# Patient Record
Sex: FEMALE | Race: WHITE | NOT HISPANIC OR LATINO | Employment: OTHER | URBAN - METROPOLITAN AREA
[De-identification: names, ages, dates, MRNs, and addresses within clinical notes are randomized per-mention and may not be internally consistent; named-entity substitution may affect disease eponyms.]

---

## 2017-04-04 RX ORDER — BUPROPION HYDROCHLORIDE 150 MG/1
TABLET ORAL
Qty: 90 TABLET | Refills: 0 | Status: SHIPPED | OUTPATIENT
Start: 2017-04-04 | End: 2017-10-06 | Stop reason: SDUPTHER

## 2017-06-12 RX ORDER — SOLIFENACIN SUCCINATE 5 MG/1
TABLET, FILM COATED ORAL
Qty: 90 TABLET | Refills: 0 | Status: SHIPPED | OUTPATIENT
Start: 2017-06-12 | End: 2017-08-07

## 2017-07-12 RX ORDER — LEVOTHYROXINE SODIUM 0.1 MG/1
TABLET ORAL
Qty: 90 TABLET | Refills: 0 | Status: SHIPPED | OUTPATIENT
Start: 2017-07-12 | End: 2017-10-06 | Stop reason: SDUPTHER

## 2017-08-07 ENCOUNTER — OFFICE VISIT (OUTPATIENT)
Dept: FAMILY MEDICINE CLINIC | Facility: CLINIC | Age: 67
End: 2017-08-07

## 2017-08-07 VITALS
DIASTOLIC BLOOD PRESSURE: 70 MMHG | OXYGEN SATURATION: 97 % | HEART RATE: 64 BPM | SYSTOLIC BLOOD PRESSURE: 104 MMHG | TEMPERATURE: 98 F | WEIGHT: 207 LBS | BODY MASS INDEX: 33.27 KG/M2 | HEIGHT: 66 IN

## 2017-08-07 DIAGNOSIS — G89.29 CHRONIC RIGHT-SIDED LOW BACK PAIN WITH RIGHT-SIDED SCIATICA: ICD-10-CM

## 2017-08-07 DIAGNOSIS — E03.9 HYPOTHYROIDISM (ACQUIRED): Primary | ICD-10-CM

## 2017-08-07 DIAGNOSIS — M54.41 CHRONIC RIGHT-SIDED LOW BACK PAIN WITH RIGHT-SIDED SCIATICA: ICD-10-CM

## 2017-08-07 DIAGNOSIS — Z12.31 ENCOUNTER FOR SCREENING MAMMOGRAM FOR HIGH-RISK PATIENT: Primary | ICD-10-CM

## 2017-08-07 DIAGNOSIS — N32.81 OAB (OVERACTIVE BLADDER): ICD-10-CM

## 2017-08-07 PROBLEM — M54.40 CHRONIC RIGHT-SIDED LOW BACK PAIN WITH SCIATICA: Status: ACTIVE | Noted: 2017-08-07

## 2017-08-07 LAB
ALBUMIN SERPL-MCNC: 4.4 G/DL (ref 3.5–5.2)
ALBUMIN/GLOB SERPL: 1.8 G/DL
ALP SERPL-CCNC: 86 U/L (ref 39–117)
ALT SERPL-CCNC: 15 U/L (ref 1–33)
AST SERPL-CCNC: 13 U/L (ref 1–32)
BILIRUB SERPL-MCNC: 0.2 MG/DL (ref 0.1–1.2)
BUN SERPL-MCNC: 13 MG/DL (ref 8–23)
BUN/CREAT SERPL: 14.9 (ref 7–25)
CALCIUM SERPL-MCNC: 9.6 MG/DL (ref 8.6–10.5)
CHLORIDE SERPL-SCNC: 101 MMOL/L (ref 98–107)
CO2 SERPL-SCNC: 28.9 MMOL/L (ref 22–29)
CREAT SERPL-MCNC: 0.87 MG/DL (ref 0.57–1)
GLOBULIN SER CALC-MCNC: 2.5 GM/DL
GLUCOSE SERPL-MCNC: 84 MG/DL (ref 65–99)
POTASSIUM SERPL-SCNC: 4.6 MMOL/L (ref 3.5–5.2)
PROT SERPL-MCNC: 6.9 G/DL (ref 6–8.5)
SODIUM SERPL-SCNC: 144 MMOL/L (ref 136–145)
T4 FREE SERPL-MCNC: 1.5 NG/DL (ref 0.93–1.7)
TSH SERPL DL<=0.005 MIU/L-ACNC: 1.4 MIU/ML (ref 0.27–4.2)

## 2017-08-07 PROCEDURE — 99214 OFFICE O/P EST MOD 30 MIN: CPT | Performed by: FAMILY MEDICINE

## 2017-08-07 PROCEDURE — 72100 X-RAY EXAM L-S SPINE 2/3 VWS: CPT | Performed by: FAMILY MEDICINE

## 2017-08-07 RX ORDER — ALPHA LIPOIC ACID 200 MG
CAPSULE ORAL
COMMUNITY
Start: 2016-01-14

## 2017-08-07 RX ORDER — CHOLECALCIFEROL (VITAMIN D3) 50 MCG
TABLET ORAL
COMMUNITY
Start: 2013-08-12

## 2017-08-07 RX ORDER — ALBUTEROL SULFATE 90 UG/1
AEROSOL, METERED RESPIRATORY (INHALATION)
COMMUNITY
Start: 2013-08-19 | End: 2019-07-15 | Stop reason: SDUPTHER

## 2017-08-07 RX ORDER — BACLOFEN 10 MG/1
10 TABLET ORAL 3 TIMES DAILY PRN
Qty: 20 TABLET | Refills: 2 | Status: SHIPPED | OUTPATIENT
Start: 2017-08-07 | End: 2018-08-20

## 2017-08-07 RX ORDER — OXYBUTYNIN CHLORIDE 5 MG/1
5 TABLET, EXTENDED RELEASE ORAL DAILY
Qty: 30 TABLET | Refills: 5 | Status: SHIPPED | OUTPATIENT
Start: 2017-08-07 | End: 2018-04-15 | Stop reason: SDUPTHER

## 2017-08-07 NOTE — PROGRESS NOTES
Procedure   Procedures   X Ray report:    Further evaluate her complaint of persistent low back pain I have requested a lumbar spine series here today.  There are no old ones to compare this to but this does show significant degenerative disc disease without spondylolisthesis.  Also noted is atherosclerotic changes in the aorta without evidence of aortic aneurysm.

## 2017-08-07 NOTE — PROGRESS NOTES
Subjective   Sharon Gonzalez is a 67 y.o. female presenting with   Chief Complaint   Patient presents with   • Back Pain     low back right side    • Med Refill     wants to change vesicare to oxybutynin per her insurance         HPI Comments: 67-year-old  white female nonsmoker here because she has recurrent low back pain that radiates to the right side.  She says occasionally when she is walking up stairs she feels like she has some weakness in her right leg.  She does not have any numbness in the leg.  She does not have any history of spinal fracture.    She was told by her insurance the Vesicare is very expensive and they suggested she go on Ditropan.  I will make that change.    She has not had a colonoscopy in a long time and does not want to do that but is willing to do a colon stool test.    She has a history of acquired hypothyroidism and is on levothyroxin but has not had a TSH in 2 years so I will order that today as well.    Back Pain          The following portions of the patient's history were reviewed and updated as appropriate: current medications, past family history, past medical history, past social history, past surgical history and problem list.    Review of Systems   Musculoskeletal: Positive for back pain.   All other systems reviewed and are negative.      Objective   Physical Exam   Constitutional: She is oriented to person, place, and time. She appears well-developed and well-nourished. No distress.   HENT:   Head: Normocephalic and atraumatic.   Eyes: EOM are normal. Pupils are equal, round, and reactive to light.   Neck: Normal range of motion. Neck supple.   Cardiovascular: Normal rate and regular rhythm.    Pulmonary/Chest: Effort normal and breath sounds normal.   Abdominal: Soft. Bowel sounds are normal. She exhibits no distension. There is no tenderness.   Musculoskeletal: She exhibits tenderness (he has discomfort to range of motion of her lumbar spine without deformity). She  exhibits no edema or deformity.   Neurological: She is alert and oriented to person, place, and time. No cranial nerve deficit. Coordination normal.   Skin: Skin is warm and dry. She is not diaphoretic.   Psychiatric: She has a normal mood and affect. Her behavior is normal.   Nursing note and vitals reviewed.      Assessment/Plan   Sharon was seen today for back pain and med refill.    Diagnoses and all orders for this visit:    Hypothyroidism (acquired)  -     Comprehensive Metabolic Panel  -     TSH  -     T4, Free    Chronic right-sided low back pain with right-sided sciatica  -     XR Spine Lumbar 2 or 3 View    OAB (overactive bladder)    Other orders  -     oxybutynin XL (DITROPAN-XL) 5 MG 24 hr tablet; Take 1 tablet by mouth Daily.                   I would like him to return for another visit in 6 month(s)

## 2017-08-07 NOTE — PATIENT INSTRUCTIONS
This is a very nice 67-year-old who is here for follow-up for hypothyroidism and overactive bladder but who also has persistent low back pain.  I will prescribe baclofen and request physical therapy.  I would like her to call if this does not help.

## 2017-08-10 ENCOUNTER — HOSPITAL ENCOUNTER (OUTPATIENT)
Dept: PHYSICAL THERAPY | Facility: HOSPITAL | Age: 67
Setting detail: THERAPIES SERIES
Discharge: HOME | End: 2017-08-10

## 2017-08-10 ENCOUNTER — HOSPITAL ENCOUNTER (OUTPATIENT)
Dept: MAMMOGRAPHY | Facility: HOSPITAL | Age: 67
Discharge: HOME | End: 2017-08-10
Admitting: FAMILY MEDICINE

## 2017-08-10 DIAGNOSIS — M54.41 CHRONIC RIGHT-SIDED LOW BACK PAIN WITH RIGHT-SIDED SCIATICA: Primary | ICD-10-CM

## 2017-08-10 DIAGNOSIS — G89.29 CHRONIC RIGHT-SIDED LOW BACK PAIN WITH RIGHT-SIDED SCIATICA: Primary | ICD-10-CM

## 2017-08-10 PROCEDURE — 77063 BREAST TOMOSYNTHESIS BI: CPT

## 2017-08-10 PROCEDURE — G0202 SCR MAMMO BI INCL CAD: HCPCS

## 2017-08-10 PROCEDURE — 97161 PT EVAL LOW COMPLEX 20 MIN: CPT

## 2017-08-10 NOTE — THERAPY EVALUATION
"    Outpatient Physical Therapy Ortho Initial Evaluation  [facility]     Patient Name: Sharon Gonzalez  : 1950  MRN: [id number]  Today's Date: 8/10/2017      Visit Date: 08/10/2017    Patient Active Problem List   Diagnosis   • Hypothyroidism (acquired)   • Skin lesion of face   • Chronic right-sided low back pain with sciatica   • OAB (overactive bladder)        Past Medical History:   Diagnosis Date   • OAB (overactive bladder)         Past Surgical History:   Procedure Laterality Date   • APPENDECTOMY     • BREAST CYST EXCISION Right        Visit Dx:     ICD-10-CM ICD-9-CM   1. Chronic right-sided low back pain with right-sided sciatica M54.41 724.2    G89.29 724.3     338.29             Patient History       08/10/17 0900          History    Chief Complaint Difficulty with daily activities;Joint stiffness;Numbness;Tingling;Pain  -LN      Type of Pain Back pain   right  -LN      Date Current Problem(s) Began --   6 months ago  -LN      Brief Description of Current Complaint Patient reports history of right sided LBP for 6 months without any injury. 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  Patient had x-ray which showed arthritis. Patient reports occasional numbness down lateral right upper leg.   -LN      Onset Date- PT 08/10/2017  -LN      Patient/Caregiver Goals Relieve pain;Improve mobility   \"able to lie down without being uncomfortable.\"   -LN      Occupation/sports/leisure activities newly retired from [employer]; likes to do puzzles, word games, reading. cleaning house  -LN      How has patient tried to help current problem? Alexis   has script for muscle relaxer  -LN      What clinical tests have you had for this problem? X-ray  -LN      Results of Clinical Tests arthritis per patient  -LN      Related/Recent Hospitalizations No  -LN      History of Previous Related Injuries none reported except 1 episode when working of back pain  " "-LN      Pain     Pain Location Back   right  -LN      Pain at Present 3  -LN      Pain at Best 0  -LN      Pain at Worst 6  -LN      Pain Frequency Intermittent  -LN      Pain Description Aching;Sharp   occasionally sharp  -LN      What Performance Factors Make the Current Problem(s) WORSE? sitting a long time >60 minutes; lying down especially on couch; turning over in bed  -LN      What Performance Factors Make the Current Problem(s) BETTER? moving a little/changing positions  -LN      Tolerance Time- Standing good   \"It doesn't really hurt when I stand.\"   -LN      Tolerance Time- Sitting limited at 60 minutes  -LN      Tolerance Time- Walking good except for long distances  -LN      Tolerance Time- Lying limited  -LN      Is your sleep disturbed? Yes  -LN      Is medication used to assist with sleep? No  -LN      What position do you sleep in? Right sidelying;Left sidelying  -LN      Difficulties at work? recently retired  -LN      Difficulties with ADL's? pain with bending over  -LN      Difficulties with recreational activities? pain with house cleaning  -LN      Fall Risk Assessment    Any falls in the past year: No  -LN      Services    Prior Rehab/Home Health Experiences No  -LN      Daily Activities    Primary Language English  -LN      Are you able to read Yes  -LN      Are you able to write Yes  -LN      How does patient learn best? Listening;Demonstration  -LN      Teaching needs identified Home Exercise Program;Management of Condition;Other (comment)   Risks and benefits of treatment explained to patient  -LN      Patient is concerned about/has problems with Climbing Stairs;Difficulty with self care (i.e. bathing, dressing, toileting:;Flexibility;Performing home management (household chores, shopping, care of dependents);Sitting;Walking  -LN      Does patient have problems with the following? None  -LN      Barriers to learning None  -LN      Pt Participated in POC and Goals Yes  -LN      Safety    " "Are you being hurt, hit, or frightened by anyone at home or in your life? No  -LN      Are you being neglected by a caregiver No  -LN        User Key  (r) = Recorded By, (t) = Taken By, (c) = Cosigned By    Initials Name Provider Type    LN Beatris Simental, PT Physical Therapist                PT Ortho       08/10/17 0900    Subjective Comments    Subjective Comments \"It doesn't hurt all the time.\" \"Sometimes I get a catch on the right side and I get a sharp pain, otherwise it aches.\"   -LN    Precautions and Contraindications    Precautions/Limitations no known precautions/limitations  -LN    Subjective Pain    Able to rate subjective pain? yes  -LN    Pre-Treatment Pain Level 3  -LN    Pain Assessment    Pain Score 3  -LN    Pain Location Back  -LN    Pain Orientation Right  -LN    Pain Radiating Towards right hip/groin/lateral thigh  -LN    Pain Frequency Intermittent  -LN    Effect of Pain on Daily Activities Pain with bending over to put on shoes  -LN    Work-Related Injury No  -LN    Posture/Observations    Thoracic Kyphosis Mild;Increased  -LN    Rounded Shoulders Mild  -LN    Lumbar lordosis Mild;Increased  -LN    Iliac crests Left:;Elevated;Standing posture  -LN    Lumbosacral Palpation    SI Left:;Tender  -LN    Lumbosacral Segment Left:;Tender  -LN    Piriformis Left:;Tender  -LN    Erector Spinae (Paraspinals) Left:;Tender  -LN    Lumbar/SI Special Tests    Standing Flexion Test (SI Dysfunction) Right:;Positive  -LN    SLR (Neural Tension) Right:;Positive  -LN    SI Compression Test (SI Dysfunction) Bilateral:;Negative  -LN    SI Distraction Test (SI Dysfunction) Bilateral:;Negative  -LN    AMARA (hip vs. SI Dysfunction) Right:;Positive  -LN    FAIR Test (Piriformis Syndrome) Right:;Positive  -LN    Special Lumbosacral Questions    Do you have pain going up/down stairs? Yes  -LN    Trunk    Flexion AROM Deficit 50%   pain right LB  -LN    Extension AROM Deficit WFL  -LN    Lt Lat Flexion AROM " Deficit 50%   pain  -LN    Right Lateral Flexion AROM Deficit 50%   pain  -LN    Lt Rotation AROM Deficit 75%   pain right LB  -LN    Right Rotation AROM Deficit WFL  -LN    Trunk    Trunk Flexion Gross Movement (4/5) good  -LN    Trunk Extension Gross Movement (4-/5) good minus  -LN    Left Hip    Hip Flexion Gross Movement (5/5) normal  -LN    Right Hip    Hip Flexion Gross Movement (4+/5) good plus   pain right LB area  -LN    Left Knee    Knee Extension Gross Movement (5/5) normal  -LN    Knee Flexion Gross Movement (5/5) normal  -LN    Right Knee    Knee Extension Gross Movement (5/5) normal  -LN    Knee Flexion Gross Movement (5/5) normal  -LN    Left Ankle/Foot    Ankle PF Gross Movement (5/5) normal  -LN    Ankle Dorsiflexion Gross Movement (5/5) normal  -LN    Right Ankle/Foot    Ankle PF Gross Movement (5/5) normal  -LN    Ankle Dorsiflexion Gross Movement (5/5) normal  -LN    Lower Extremity Flexibility    Hamstrings Right:;Moderately limited;Left:;Mildly limited  -LN    Hip Flexors Right:;Mildly limited  -LN    ITB Right:;Moderately limited  -LN    Gait Assessment/Treatment    Gait, Winston Level independent  -LN    Gait, Assistive Device --   none  -LN    Stairs Assessment/Treatment    Stairs, Comment She reports pain with going up and down stairs- has about 12 to enter home  -LN      User Key  (r) = Recorded By, (t) = Taken By, (c) = Cosigned By    Initials Name Provider Type    LN Beatris Simental, PT Physical Therapist                            Therapy Education       08/10/17 1013          Therapy Education    Education Details Patient to use MH PRN at home.   -LN      Given HEP;Symptoms/condition management;Pain management  -LN      Program New   PPT and LTR- to do 2 x day as tolerated.  -LN      How Provided Verbal;Demonstration;Written  -LN      Provided to Patient  -LN      Level of Understanding Teach back education performed;Verbalized;Demonstrated  -LN        User Key  (r) =  "Recorded By, (t) = Taken By, (c) = Cosigned By    Initials Name Provider Type    LN Beatris Simental, PT Physical Therapist                PT OP Goals       08/10/17 0900       PT Short Term Goals    STG Date to Achieve 08/24/17  -LN     STG 1 Patient to verbally report decreased pain in right LB to <5/10 \"at it's worst with ADLs.  -LN     STG 2 Patient to have improved trunk ROM by 25%.   -LN     STG 3 Patient to report decreased disturbed sleep by 50% secondary to less back pain.   -LN     Long Term Goals    LTG Date to Achieve 09/07/17  -LN     LTG 1 Patient to verbally report decreased pain in right LB to <3/10 with ADLs and light housework activities.   -LN     LTG 2 Patient independent with HEP issued by therapist.   -LN     LTG 3 Trunk ROM WFL all planes and pain <3/10.  -LN     LTG 4 Right hip strength improved to 5/5.   -LN     LTG 5 Patient able to go up and down her stairs at home with no c/o increased LBP.   -LN     Time Calculation    PT Goal Re-Cert Due Date 09/07/17  -LN       User Key  (r) = Recorded By, (t) = Taken By, (c) = Cosigned By    Initials Name Provider Type    IGNACIO Simental, PT Physical Therapist                PT Assessment/Plan       08/10/17 1013       PT Assessment    Functional Limitations Impaired gait;Impaired locomotion;Limitation in home management;Performance in self-care ADL;Performance in leisure activities;Limitations in community activities;Limitations in functional capacity and performance  -LN     Impairments Gait;Impaired flexibility;Muscle strength;Sensation;Pain;Posture;Range of motion;Locomotion;Impaired postural alignment  -LN     Assessment Comments Patient presents with 6 month history of right sided LBP with occasional radiating symptoms into right hip/groin/lateral thigh. Patient with decreased trunk ROM, decreased flexibility right leg, decreased strength right hip, disturbed sleep, signs of pelvic obliquity, decreased sitting and walking " "tolerance. Patient with signs of DJD right lumbar with signs of possible right sciatica.  She will benefit from a good core strengthening, stretching and ROM program.   -LN     Please refer to paper survey for additional self-reported information Yes  -LN     Rehab Potential Good  -LN     Patient/caregiver participated in establishment of treatment plan and goals Yes  -LN     Patient would benefit from skilled therapy intervention Yes  -LN     PT Plan    PT Frequency 1x/week;2x/week  -LN     Predicted Duration of Therapy Intervention (days/wks) 4 weeks  -LN     Planned CPT's? PT EVAL LOW COMPLEXITY: 60359;PT THER PROC EA 15 MIN: 75419;PT HOT OR COLD PACK TREAT MCARE;PT MANUAL THERAPY EA 15 MIN: 03048;PT ELECTRICAL STIM UNATTEND: ;PT ULTRASOUND EA 15 MIN: 27420  -LN     Physical Therapy Interventions (Optional Details) modalities;manual therapy techniques;lumbar stabilization;home exercise program;patient/family education;postural re-education;ROM (Range of Motion);strengthening;stretching;taping  -LN     PT Plan Comments Progress with exercises as tolerated. Modalities PRN; Kinesiotaping PRN.   -LN       User Key  (r) = Recorded By, (t) = Taken By, (c) = Cosigned By    Initials Name Provider Type    LN Beatris Simental, PT Physical Therapist                  Exercises       08/10/17 0900          Subjective Comments    Subjective Comments \"It doesn't hurt all the time.\" \"Sometimes I get a catch on the right side and I get a sharp pain, otherwise it aches.\"   -LN      Subjective Pain    Able to rate subjective pain? yes  -LN      Pre-Treatment Pain Level 3  -LN      Exercise 1    Exercise Name 1 PPT  -LN      Cueing 1 Verbal;Tactile  -LN      Reps 1 5  -LN      Time (Seconds) 1 5  -LN      Exercise 2    Exercise Name 2 LTR  -LN      Cueing 2 Verbal;Tactile  -LN      Reps 2 5  -LN      Time (Seconds) 2 5  -LN        User Key  (r) = Recorded By, (t) = Taken By, (c) = Cosigned By    Initials Name Provider " Type    LN Beatris Simental, PT Physical Therapist           Manual Rx (last 36 hours)      Manual Treatments       08/10/17 0900          Manual Rx 1    Manual Rx 1 Location pelvis  -LN      Manual Rx 1 Type MET- shotgun/right anterior innominate  -LN      Manual Rx 1 Duration Good pelvic alignment and leg lengths = after MET.   -LN        User Key  (r) = Recorded By, (t) = Taken By, (c) = Cosigned By    Initials Name Provider Type    IGNACIO Simental, PT Physical Therapist                            Outcome Measures       08/10/17 0900          Other Outcome Measure Tool Used    Other Outcome Measure Tool Comments Back index- score of 30  -LN      Functional Assessment    Outcome Measure Options Other Outcome Measure  -LN        User Key  (r) = Recorded By, (t) = Taken By, (c) = Cosigned By    Initials Name Provider Type    IGNACIO Simental, PT Physical Therapist            Time Calculation:   Start Time: 0900  Stop Time: 0945  Time Calculation (min): 45 min     Therapy Charges for Today     Code Description Service Date Service Provider Modifiers Qty    23092988295 HC PT EVAL LOW COMPLEXITY 3 8/10/2017 Beatris Simental, PT GP 1          PT G-Codes  Outcome Measure Options: Other Outcome Measure         Beatris Simental, PT  8/10/2017

## 2017-08-15 ENCOUNTER — HOSPITAL ENCOUNTER (OUTPATIENT)
Dept: PHYSICAL THERAPY | Facility: HOSPITAL | Age: 67
Setting detail: THERAPIES SERIES
Discharge: HOME | End: 2017-08-15

## 2017-08-15 DIAGNOSIS — G89.29 CHRONIC RIGHT-SIDED LOW BACK PAIN WITH RIGHT-SIDED SCIATICA: Primary | ICD-10-CM

## 2017-08-15 DIAGNOSIS — M54.41 CHRONIC RIGHT-SIDED LOW BACK PAIN WITH RIGHT-SIDED SCIATICA: Primary | ICD-10-CM

## 2017-08-15 PROCEDURE — G0283 ELEC STIM OTHER THAN WOUND: HCPCS

## 2017-08-15 PROCEDURE — 97110 THERAPEUTIC EXERCISES: CPT

## 2017-08-15 NOTE — THERAPY TREATMENT NOTE
"    Outpatient Physical Therapy Ortho Treatment Note  [facility]     Patient Name: Sharon Gonzalez  : 1950  MRN: [id number]  Today's Date: 8/15/2017      Visit Date: 08/15/2017    Visit Dx:    ICD-10-CM ICD-9-CM   1. Chronic right-sided low back pain with right-sided sciatica M54.41 724.2    G89.29 724.3     338.29       Patient Active Problem List   Diagnosis   • Hypothyroidism (acquired)   • Skin lesion of face   • Chronic right-sided low back pain with sciatica   • OAB (overactive bladder)        Past Medical History:   Diagnosis Date   • OAB (overactive bladder)         Past Surgical History:   Procedure Laterality Date   • APPENDECTOMY     • BREAST CYST EXCISION Right                              PT Assessment/Plan       08/15/17 0900       PT Assessment    Assessment Comments Patient continues with persistent pain primarily in right LS area; she did report decreased pain after treatment today. She is tolerating exercises fairly well with only minimal discomfort reported.   -LN     PT Plan    PT Frequency 1x/week;2x/week  -LN     PT Plan Comments Continue per P.O.C. Progress with exercises as tolerated; modalities PRN.   -LN       User Key  (r) = Recorded By, (t) = Taken By, (c) = Cosigned By    Initials Name Provider Type    LN Beatris Simental, PT Physical Therapist                Modalities       08/15/17 0900          Subjective Comments    Subjective Comments \"I'm stiff this am.\"   -LN      Subjective Pain    Able to rate subjective pain? yes  -LN      Pre-Treatment Pain Level 2  -LN      Post-Treatment Pain Level 1  -LN      Subjective Pain Comment \"Stiff and sore\"; Patient reported feeling better after treatment.\"   -LN      Moist Heat     Applied Yes  -LN      Location LB with IFC with patient supine in hooklying  -LN      Rx Minutes 15 mins  -LN       S/P Rx Yes  -LN      ELECTRICAL STIMULATION    Attended/Unattended Unattended  -LN      Stimulation Type IFC   with   -LN      " "Location/Electrode Placement/Other Bilateral LS area; R>L  -LN      Rx Minutes 15 mins  -LN        User Key  (r) = Recorded By, (t) = Taken By, (c) = Cosigned By    Initials Name Provider Type    IGNACIO Simental, PT Physical Therapist                Exercises       08/15/17 0900          Subjective Comments    Subjective Comments \"I'm stiff this am.\"   -LN      Subjective Pain    Able to rate subjective pain? yes  -LN      Pre-Treatment Pain Level 2  -LN      Post-Treatment Pain Level 1  -LN      Subjective Pain Comment \"Stiff and sore\"; Patient reported feeling better after treatment.\"   -LN      Exercise 1    Exercise Name 1 PPT  -LN      Cueing 1 Verbal;Tactile  -LN      Reps 1 10  -LN      Time (Seconds) 1 5  -LN      Exercise 2    Exercise Name 2 LTR  -LN      Cueing 2 Verbal;Tactile  -LN      Reps 2 5  -LN      Time (Seconds) 2 5  -LN      Exercise 3    Exercise Name 3  PPT with ball squeeze  -LN      Reps 3 Other   6  -LN      Time (Seconds) 3 5  -LN      Exercise 4    Exercise Name 4 PPT with hooklying hip abduction vs. TB  -LN      Reps 4 10  -LN      Time (Seconds) 4 5  -LN      Additional Comments blue TB  -LN        User Key  (r) = Recorded By, (t) = Taken By, (c) = Cosigned By    Initials Name Provider Type    IGNACIO Simental, PT Physical Therapist                        Manual Rx (last 36 hours)      Manual Treatments       08/15/17 0900          Manual Rx 1    Manual Rx 1 Location pelvis  -LN      Manual Rx 1 Type MET- shotgun/right anterior innominate  -LN      Manual Rx 1 Duration Good pelvic alignment and leg lengths = after MET.   -LN        User Key  (r) = Recorded By, (t) = Taken By, (c) = Cosigned By    Initials Name Provider Type    IGNACIO Simental, PT Physical Therapist                    Therapy Education       08/15/17 1047          Therapy Education    Education Details Issued blue TB for home. Encouraged patient to only do exercises in a painfree range. She " is also to try ball squeeze execise in sitting if it is too uncomfortable in supine.   -LN      Given HEP;Symptoms/condition management;Pain management  -LN      Program New   added PPT with ball squeeze and PPT with hooklying hip abduction vs. TB.   -LN      How Provided Verbal;Demonstration;Written  -LN      Provided to Patient  -LN      Level of Understanding Teach back education performed;Verbalized;Demonstrated  -LN        User Key  (r) = Recorded By, (t) = Taken By, (c) = Cosigned By    Initials Name Provider Type    LN Beatris Simental, PT Physical Therapist                Time Calculation:   Start Time: 0900  Stop Time: 0945  Time Calculation (min): 45 min    Therapy Charges for Today     Code Description Service Date Service Provider Modifiers Qty    25279375370 HC PT ELECTRICAL STIM UNATTENDED 8/15/2017 Beatris Simental, PT  1    [id number]  PT THER PROC EA 15 MIN 8/15/2017 Beatris Simental, PT GP 1                    Beatris Simental, PT  8/15/2017

## 2017-08-22 ENCOUNTER — APPOINTMENT (OUTPATIENT)
Dept: PHYSICAL THERAPY | Facility: HOSPITAL | Age: 67
End: 2017-08-22

## 2017-08-24 ENCOUNTER — HOSPITAL ENCOUNTER (OUTPATIENT)
Dept: PHYSICAL THERAPY | Facility: HOSPITAL | Age: 67
Setting detail: THERAPIES SERIES
Discharge: HOME | End: 2017-08-24

## 2017-08-24 DIAGNOSIS — M54.41 CHRONIC RIGHT-SIDED LOW BACK PAIN WITH RIGHT-SIDED SCIATICA: Primary | ICD-10-CM

## 2017-08-24 DIAGNOSIS — G89.29 CHRONIC RIGHT-SIDED LOW BACK PAIN WITH RIGHT-SIDED SCIATICA: Primary | ICD-10-CM

## 2017-08-24 PROCEDURE — G0283 ELEC STIM OTHER THAN WOUND: HCPCS

## 2017-08-24 PROCEDURE — 97110 THERAPEUTIC EXERCISES: CPT

## 2017-08-24 NOTE — THERAPY TREATMENT NOTE
"    Outpatient Physical Therapy Ortho Treatment Note  [facility]     Patient Name: Sharon Gonzalez  : 1950  MRN: [id number]  Today's Date: 2017      Visit Date: 2017    Visit Dx:    ICD-10-CM ICD-9-CM   1. Chronic right-sided low back pain with right-sided sciatica M54.41 724.2    G89.29 724.3     338.29       Patient Active Problem List   Diagnosis   • Hypothyroidism (acquired)   • Skin lesion of face   • Chronic right-sided low back pain with sciatica   • OAB (overactive bladder)        Past Medical History:   Diagnosis Date   • OAB (overactive bladder)         Past Surgical History:   Procedure Laterality Date   • APPENDECTOMY     • BREAST CYST EXCISION Right              PT Ortho       17 1500    Subjective Pain    Able to rate subjective pain? yes  -LN    Subjective Pain Comment \"It's more a discomfort.\"   -LN      User Key  (r) = Recorded By, (t) = Taken By, (c) = Cosigned By    Initials Name Provider Type    IGNACIO Simental, PT Physical Therapist                            PT Assessment/Plan       17 1500       PT Assessment    Assessment Comments Patient with overall decreased LBP, but discomfort persists R>L LB. She is tolerating exercises fairly well.  Overall decreased guarding of mobility noted with getting on and off treatment table.   -LN     PT Plan    PT Frequency 1x/week;2x/week  -LN     PT Plan Comments Continue per P.O.C.   -LN       User Key  (r) = Recorded By, (t) = Taken By, (c) = Cosigned By    Initials Name Provider Type    IGNACIO Simental, PT Physical Therapist                Modalities       17 1500          Moist Heat    MH Applied Yes  -LN      Location LB with IFC with patient supine in hooklying  -LN      Rx Minutes 15 mins  -LN       S/P Rx Yes  -LN      ELECTRICAL STIMULATION    Attended/Unattended Unattended  -LN      Stimulation Type IFC   with   -LN      Location/Electrode Placement/Other Bilateral LS area; R>L  -LN      " "Rx Minutes 15 mins  -LN        User Key  (r) = Recorded By, (t) = Taken By, (c) = Cosigned By    Initials Name Provider Type    IGNACIO Simental, PT Physical Therapist                Exercises       08/24/17 1500          Subjective Comments    Subjective Comments \"My back has been feeling better.\"  \"It's not really pain; it's more a discomfort or soreness.\" \"I found my TENS unit and I've been using it.\"   -LN      Subjective Pain    Able to rate subjective pain? yes  -LN      Subjective Pain Comment \"It's more a discomfort.\"   -LN      Exercise 1    Exercise Name 1 PPT  -LN      Cueing 1 Verbal;Tactile  -LN      Reps 1 10  -LN      Time (Seconds) 1 5  -LN      Exercise 2    Exercise Name 2 LTR  -LN      Cueing 2 Verbal;Tactile  -LN      Reps 2 5  -LN      Time (Seconds) 2 5  -LN      Exercise 3    Exercise Name 3  PPT with ball squeeze  -LN      Reps 3 10  -LN      Time (Seconds) 3 5  -LN      Exercise 4    Exercise Name 4 PPT with hooklying hip abduction vs. TB  -LN      Reps 4 10  -LN      Time (Seconds) 4 5  -LN      Additional Comments blue TB  -LN      Exercise 5    Exercise Name 5 supine hamstring stretch with sheet  -LN      Reps 5 5  -LN      Time (Seconds) 5 10  -LN        User Key  (r) = Recorded By, (t) = Taken By, (c) = Cosigned By    Initials Name Provider Type    IGNACIO Simental, PT Physical Therapist                        Manual Rx (last 36 hours)      Manual Treatments       08/24/17 1500          Manual Rx 1    Manual Rx 1 Location pelvis  -LN      Manual Rx 1 Type MET- shotgun/right anterior innominate  -LN      Manual Rx 1 Duration Good pelvic alignment and leg lengths = after MET.   -LN        User Key  (r) = Recorded By, (t) = Taken By, (c) = Cosigned By    Initials Name Provider Type    IGNACIO Simental, PT Physical Therapist                    Therapy Education       08/24/17 1078          Therapy Education    Education Details Patient to use MH and her home TENS " unit PRN for pain relief.  -LN      Given HEP;Symptoms/condition management;Pain management  -LN      Program New   added supine hamstring stretch with sheet  -LN      How Provided Verbal;Demonstration;Written  -LN      Provided to Patient  -LN      Level of Understanding Teach back education performed;Verbalized;Demonstrated  -LN        User Key  (r) = Recorded By, (t) = Taken By, (c) = Cosigned By    Initials Name Provider Type    LN Beatris Simental, PT Physical Therapist                Time Calculation:   Start Time: 1512  Stop Time: 1600  Time Calculation (min): 48 min    Therapy Charges for Today     Code Description Service Date Service Provider Modifiers Qty    57598775449 HC PT ELECTRICAL STIM UNATTENDED 8/24/2017 Beatris Simental, PT  1    51527375977 HC PT THER PROC EA 15 MIN 8/24/2017 Beatris Simental, PT GP 1                    Beatris Simental, PT  8/24/2017

## 2017-08-31 ENCOUNTER — HOSPITAL ENCOUNTER (OUTPATIENT)
Dept: PHYSICAL THERAPY | Facility: HOSPITAL | Age: 67
Setting detail: THERAPIES SERIES
Discharge: HOME | End: 2017-08-31

## 2017-08-31 DIAGNOSIS — G89.29 CHRONIC RIGHT-SIDED LOW BACK PAIN WITH RIGHT-SIDED SCIATICA: Primary | ICD-10-CM

## 2017-08-31 DIAGNOSIS — M54.41 CHRONIC RIGHT-SIDED LOW BACK PAIN WITH RIGHT-SIDED SCIATICA: Primary | ICD-10-CM

## 2017-08-31 PROCEDURE — G0283 ELEC STIM OTHER THAN WOUND: HCPCS

## 2017-08-31 PROCEDURE — 97110 THERAPEUTIC EXERCISES: CPT

## 2017-09-08 RX ORDER — SOLIFENACIN SUCCINATE 5 MG/1
TABLET, FILM COATED ORAL
Qty: 90 TABLET | Refills: 0 | Status: SHIPPED | OUTPATIENT
Start: 2017-09-08 | End: 2018-08-20 | Stop reason: CLARIF

## 2017-09-26 ENCOUNTER — DOCUMENTATION (OUTPATIENT)
Dept: PHYSICAL THERAPY | Facility: HOSPITAL | Age: 67
End: 2017-09-26

## 2017-09-26 NOTE — THERAPY DISCHARGE NOTE
"   Outpatient Physical Therapy Discharge Summary         Patient Name: Sharon Gonzalez  : 1950  MRN: [id number]    Today's Date: 2017    Visit Dx:  No diagnosis found.          PT OP Goals       17 1400       PT Short Term Goals    STG Date to Achieve 17  -LN     STG 1 Patient to verbally report decreased pain in right LB to <5/10 \"at it's worst with ADLs.  -LN     STG 1 Progress Met  -LN     STG 2 Patient to have improved trunk ROM by 25%.   -LN     STG 2 Progress Met  -LN     STG 3 Patient to report decreased disturbed sleep by 50% secondary to less back pain.   -LN     STG 3 Progress Met  -LN     Long Term Goals    LTG Date to Achieve 17  -LN     LTG 1 Patient to verbally report decreased pain in right LB to <3/10 with ADLs and light housework activities.   -LN     LTG 1 Progress Met  -LN     LTG 2 Patient independent with HEP issued by therapist.   -LN     LTG 2 Progress Met  -LN     LTG 3 Trunk ROM WFL all planes and pain <3/10.  -LN     LTG 3 Progress Met  -LN     LTG 4 Right hip strength improved to 5/5.   -LN     LTG 4 Progress Not Met  -LN     LTG 4 Progress Comments Had not been reassessed but overall strength is improved.  -LN     LTG 5 Patient able to go up and down her stairs at home with no c/o increased LBP.   -LN     LTG 5 Progress Met  -LN       User Key  (r) = Recorded By, (t) = Taken By, (c) = Cosigned By    Initials Name Provider Type    LN Beatris Simental, PT Physical Therapist          OP PT Discharge Summary  Date of Discharge: 17  Reason for Discharge: All goals achieved, Independent, Patient/Caregiver request, other (comment) (Patient requested to hold PT secondary to her insurance changing and wanted to get that straightened out. No further word from patient or MD. )  Outcomes Achieved: Able to achieve all goals within established timeline (all goals met except hip strength goal because had not been reassessed..)  Discharge Instructions: Patient " discharged with HEP.  Patient to call with any questions or concerns.       Time Calculation:                    Beatris Simental, PT  9/26/2017

## 2017-10-05 ENCOUNTER — FLU SHOT (OUTPATIENT)
Dept: FAMILY MEDICINE CLINIC | Facility: CLINIC | Age: 67
End: 2017-10-05

## 2017-10-05 ENCOUNTER — TELEPHONE (OUTPATIENT)
Dept: FAMILY MEDICINE CLINIC | Facility: CLINIC | Age: 67
End: 2017-10-05

## 2017-10-05 DIAGNOSIS — Z23 NEED FOR VACCINATION FOR PNEUMOCOCCUS: Primary | ICD-10-CM

## 2017-10-05 PROCEDURE — G0009 ADMIN PNEUMOCOCCAL VACCINE: HCPCS | Performed by: FAMILY MEDICINE

## 2017-10-05 PROCEDURE — 90670 PCV13 VACCINE IM: CPT | Performed by: FAMILY MEDICINE

## 2017-10-05 PROCEDURE — G0008 ADMIN INFLUENZA VIRUS VAC: HCPCS | Performed by: FAMILY MEDICINE

## 2017-10-06 RX ORDER — LEVOTHYROXINE SODIUM 0.1 MG/1
100 TABLET ORAL DAILY
Qty: 90 TABLET | Refills: 0 | Status: SHIPPED | OUTPATIENT
Start: 2017-10-06 | End: 2018-01-20 | Stop reason: SDUPTHER

## 2017-10-06 RX ORDER — BUPROPION HYDROCHLORIDE 150 MG/1
150 TABLET ORAL EVERY MORNING
Qty: 90 TABLET | Refills: 0 | Status: SHIPPED | OUTPATIENT
Start: 2017-10-06 | End: 2018-02-10 | Stop reason: SDUPTHER

## 2017-10-09 RX ORDER — LEVOTHYROXINE SODIUM 0.1 MG/1
TABLET ORAL
Qty: 90 TABLET | Refills: 0 | Status: SHIPPED | OUTPATIENT
Start: 2017-10-09 | End: 2018-08-20 | Stop reason: CLARIF

## 2017-10-23 RX ORDER — BUPROPION HYDROCHLORIDE 150 MG/1
TABLET ORAL
Qty: 90 TABLET | Refills: 0 | Status: SHIPPED | OUTPATIENT
Start: 2017-10-23 | End: 2018-08-20 | Stop reason: SDUPTHER

## 2018-01-22 RX ORDER — LEVOTHYROXINE SODIUM 0.1 MG/1
TABLET ORAL
Qty: 90 TABLET | Refills: 0 | Status: SHIPPED | OUTPATIENT
Start: 2018-01-22 | End: 2018-05-03 | Stop reason: SDUPTHER

## 2018-02-12 RX ORDER — BUPROPION HYDROCHLORIDE 150 MG/1
TABLET ORAL
Qty: 90 TABLET | Refills: 0 | Status: SHIPPED | OUTPATIENT
Start: 2018-02-12 | End: 2018-05-16 | Stop reason: SDUPTHER

## 2018-04-16 RX ORDER — OXYBUTYNIN CHLORIDE 5 MG/1
TABLET, EXTENDED RELEASE ORAL
Qty: 30 TABLET | Refills: 2 | Status: SHIPPED | OUTPATIENT
Start: 2018-04-16 | End: 2018-07-22 | Stop reason: SDUPTHER

## 2018-05-04 RX ORDER — LEVOTHYROXINE SODIUM 0.1 MG/1
TABLET ORAL
Qty: 90 TABLET | Refills: 0 | Status: SHIPPED | OUTPATIENT
Start: 2018-05-04 | End: 2018-08-06 | Stop reason: SDUPTHER

## 2018-05-16 RX ORDER — BUPROPION HYDROCHLORIDE 150 MG/1
TABLET ORAL
Qty: 90 TABLET | Refills: 0 | Status: SHIPPED | OUTPATIENT
Start: 2018-05-16 | End: 2018-08-21 | Stop reason: SDUPTHER

## 2018-07-23 RX ORDER — OXYBUTYNIN CHLORIDE 5 MG/1
TABLET, EXTENDED RELEASE ORAL
Qty: 30 TABLET | Refills: 2 | Status: SHIPPED | OUTPATIENT
Start: 2018-07-23 | End: 2018-10-29 | Stop reason: SDUPTHER

## 2018-08-07 RX ORDER — LEVOTHYROXINE SODIUM 0.1 MG/1
TABLET ORAL
Qty: 90 TABLET | Refills: 0 | Status: SHIPPED | OUTPATIENT
Start: 2018-08-07 | End: 2018-11-08 | Stop reason: SDUPTHER

## 2018-08-20 ENCOUNTER — OFFICE VISIT (OUTPATIENT)
Dept: FAMILY MEDICINE CLINIC | Facility: CLINIC | Age: 68
End: 2018-08-20

## 2018-08-20 VITALS
BODY MASS INDEX: 33.16 KG/M2 | SYSTOLIC BLOOD PRESSURE: 128 MMHG | TEMPERATURE: 97.8 F | RESPIRATION RATE: 22 BRPM | DIASTOLIC BLOOD PRESSURE: 76 MMHG | HEIGHT: 66 IN | WEIGHT: 206.3 LBS | OXYGEN SATURATION: 94 % | HEART RATE: 108 BPM

## 2018-08-20 DIAGNOSIS — F32.9 REACTIVE DEPRESSION: Primary | ICD-10-CM

## 2018-08-20 PROCEDURE — 99213 OFFICE O/P EST LOW 20 MIN: CPT | Performed by: FAMILY MEDICINE

## 2018-08-20 RX ORDER — MIRTAZAPINE 30 MG/1
30 TABLET, FILM COATED ORAL NIGHTLY
Qty: 30 TABLET | Refills: 2 | Status: SHIPPED | OUTPATIENT
Start: 2018-08-20 | End: 2020-05-07

## 2018-08-20 NOTE — PATIENT INSTRUCTIONS
This is a very nice 68-year-old who is here to talk about the loss of her .  I will prescribe Remeron, but would like her to call if there is anything I can do.

## 2018-08-20 NOTE — PROGRESS NOTES
Subjective   Sharon Gonzalez is a 68 y.o. female presenting with   Chief Complaint   Patient presents with   • Shortness of Breath   • Anxiety   • Depression        3 weeks ago she was seated next to her  when he had tonic-clonic activity and then .  She provided CPR at the scene and called EMS.  He was pronounced dead at the hospital and the emergency physician speculated it might of been a ruptured thoracic aneurysm.  She is not doing very well with the grief.  She has problems sleeping and she breaks down crying all the time.  Fortunately she does not have any suicidal ideation and she does have friends around who are helping her as well as to brother-in-law's who are helping go through all of her 's belongings.  We had a long discussion about grief and the best way to handle it.  I told her that this was a natural process and she should not feel bad because she is still crying after 3 weeks.         The following portions of the patient's history were reviewed and updated as appropriate: allergies, past family history, past medical history, past social history, past surgical history and problem list.    Review of Systems   Psychiatric/Behavioral: Positive for dysphoric mood.   All other systems reviewed and are negative.      Objective   Physical Exam   Constitutional: She is oriented to person, place, and time. She appears well-developed and well-nourished.   HENT:   Head: Normocephalic and atraumatic.   Eyes: Pupils are equal, round, and reactive to light. EOM are normal.   Neck: Normal range of motion. Neck supple.   Neurological: She is alert and oriented to person, place, and time.   Skin: Skin is warm and dry.   Psychiatric: Her speech is normal and behavior is normal. Judgment and thought content normal. She is not actively hallucinating. Cognition and memory are normal. She exhibits a depressed mood.   She is tearful but can laugh when she talks about past events that she found funny.   She talks a lot about the 46 years that she and her  were .  She makes good eye contact and does not display any suicidal ideation. She is attentive.   Nursing note and vitals reviewed.      Assessment/Plan   Sharon was seen today for shortness of breath, anxiety and depression.    Diagnoses and all orders for this visit:    Reactive depression    Other orders  -     mirtazapine (REMERON) 30 MG tablet; Take 1 tablet by mouth Every Night.                   I would like him to return for another visit in 3 month(s)

## 2018-08-22 RX ORDER — BUPROPION HYDROCHLORIDE 150 MG/1
TABLET ORAL
Qty: 90 TABLET | Refills: 3 | Status: SHIPPED | OUTPATIENT
Start: 2018-08-22 | End: 2019-07-17 | Stop reason: SDUPTHER

## 2018-10-31 RX ORDER — OXYBUTYNIN CHLORIDE 5 MG/1
TABLET, EXTENDED RELEASE ORAL
Qty: 30 TABLET | Refills: 2 | Status: SHIPPED | OUTPATIENT
Start: 2018-10-31 | End: 2019-02-15 | Stop reason: SDUPTHER

## 2018-11-08 RX ORDER — LEVOTHYROXINE SODIUM 0.1 MG/1
TABLET ORAL
Qty: 90 TABLET | Refills: 0 | Status: SHIPPED | OUTPATIENT
Start: 2018-11-08 | End: 2019-02-25 | Stop reason: SDUPTHER

## 2019-02-15 RX ORDER — OXYBUTYNIN CHLORIDE 5 MG/1
5 TABLET, EXTENDED RELEASE ORAL DAILY
Qty: 30 TABLET | Refills: 2 | Status: SHIPPED | OUTPATIENT
Start: 2019-02-15 | End: 2019-05-25 | Stop reason: SDUPTHER

## 2019-02-25 ENCOUNTER — TELEPHONE (OUTPATIENT)
Dept: FAMILY MEDICINE CLINIC | Facility: CLINIC | Age: 69
End: 2019-02-25

## 2019-02-25 RX ORDER — LEVOTHYROXINE SODIUM 0.1 MG/1
100 TABLET ORAL DAILY
Qty: 90 TABLET | Refills: 1 | Status: SHIPPED | OUTPATIENT
Start: 2019-02-25 | End: 2019-07-17 | Stop reason: SDUPTHER

## 2019-05-28 RX ORDER — OXYBUTYNIN CHLORIDE 5 MG/1
TABLET, EXTENDED RELEASE ORAL
Qty: 30 TABLET | Refills: 0 | Status: SHIPPED | OUTPATIENT
Start: 2019-05-28 | End: 2019-07-15

## 2019-07-03 RX ORDER — OXYBUTYNIN CHLORIDE 5 MG/1
TABLET, EXTENDED RELEASE ORAL
Qty: 30 TABLET | Refills: 0 | OUTPATIENT
Start: 2019-07-03

## 2019-07-08 RX ORDER — OXYBUTYNIN CHLORIDE 5 MG/1
TABLET, EXTENDED RELEASE ORAL
Qty: 30 TABLET | Refills: 0 | OUTPATIENT
Start: 2019-07-08

## 2019-07-15 ENCOUNTER — OFFICE VISIT (OUTPATIENT)
Dept: FAMILY MEDICINE CLINIC | Facility: CLINIC | Age: 69
End: 2019-07-15

## 2019-07-15 VITALS
BODY MASS INDEX: 32.77 KG/M2 | DIASTOLIC BLOOD PRESSURE: 62 MMHG | HEART RATE: 75 BPM | SYSTOLIC BLOOD PRESSURE: 102 MMHG | TEMPERATURE: 98.2 F | WEIGHT: 203 LBS | OXYGEN SATURATION: 94 % | RESPIRATION RATE: 16 BRPM

## 2019-07-15 DIAGNOSIS — F32.9 REACTIVE DEPRESSION: ICD-10-CM

## 2019-07-15 DIAGNOSIS — N32.81 OAB (OVERACTIVE BLADDER): ICD-10-CM

## 2019-07-15 DIAGNOSIS — E03.9 HYPOTHYROIDISM (ACQUIRED): ICD-10-CM

## 2019-07-15 DIAGNOSIS — R06.09 DYSPNEA ON EXERTION: Primary | ICD-10-CM

## 2019-07-15 DIAGNOSIS — Z00.00 ROUTINE ADULT HEALTH MAINTENANCE: ICD-10-CM

## 2019-07-15 PROCEDURE — 94640 AIRWAY INHALATION TREATMENT: CPT | Performed by: NURSE PRACTITIONER

## 2019-07-15 PROCEDURE — 99214 OFFICE O/P EST MOD 30 MIN: CPT | Performed by: NURSE PRACTITIONER

## 2019-07-15 RX ORDER — IPRATROPIUM BROMIDE AND ALBUTEROL SULFATE 2.5; .5 MG/3ML; MG/3ML
3 SOLUTION RESPIRATORY (INHALATION) ONCE
Status: COMPLETED | OUTPATIENT
Start: 2019-07-15 | End: 2019-07-15

## 2019-07-15 RX ORDER — OXYBUTYNIN CHLORIDE 5 MG/1
5 TABLET, EXTENDED RELEASE ORAL DAILY
Qty: 30 TABLET | Refills: 3 | Status: SHIPPED | OUTPATIENT
Start: 2019-07-15 | End: 2020-07-01

## 2019-07-15 RX ORDER — ALBUTEROL SULFATE 90 UG/1
2 AEROSOL, METERED RESPIRATORY (INHALATION) EVERY 4 HOURS PRN
Qty: 1 INHALER | Refills: 2 | Status: SHIPPED | OUTPATIENT
Start: 2019-07-15 | End: 2020-05-07 | Stop reason: SDUPTHER

## 2019-07-15 RX ORDER — MONTELUKAST SODIUM 10 MG/1
10 TABLET ORAL NIGHTLY
Qty: 90 TABLET | Refills: 0 | Status: SHIPPED | OUTPATIENT
Start: 2019-07-15 | End: 2020-07-01 | Stop reason: SDUPTHER

## 2019-07-15 RX ADMIN — IPRATROPIUM BROMIDE AND ALBUTEROL SULFATE 3 ML: 2.5; .5 SOLUTION RESPIRATORY (INHALATION) at 17:13

## 2019-07-15 NOTE — PROGRESS NOTES
Subjective   Sharon Gonzalez is a 69 y.o. female.     Chief Complaint   Patient presents with   • Establish Care     OAB,     • Shortness of Breath     when walking    • Depression     death of       HPI patient is new to me.  She is here to establish care for OAB, shortness of breath and ongoing depression due to loss of spouse last year.      OAB, chronic problem:  has been controlled for the most part with current medication.  She has had to recent UTIs which is unusual for her she does not usually tend to get UTIs.  Her UTIs have now resolved but she does have ongoing overactive bladder.  She knows she does not drink enough water.    Dyspnea with exertion-this is a new problem.  It occurs intermittently and is usually due to exerting herself such as taking her dog for a walk.  It is much worse when the weather is hot and humid.  She has long history of tobacco use-now smokes only every now and then.  Not had a recent chest x-ray and never been diagnosed with COPD or asthma although she does admit to a lot of allergies.  Usually her episodes of dyspnea occur after she has episodes of sneezing sometimes up to 20 times and then has a dry cough and feelings of PND.  She takes Zyrtec for allergy symptom control.  Is never been seen by an allergist.  Denies any recent hospitalizations for dyspnea.    Situational depression: This is been ongoing since her spouse's death last year due to AAA.  He  next to her on the couch and she performed CPR and is still struggling daily with missing him.  She denies any suicidal ideation.  She does not have many friends or family and they did not have any children.  She is taking bupropion which she has been on for many years without problems.  She does not want to add another medication.    Hypothyroidism: This is been chronic and she feels that has been well controlled, no recent dose changes.  She takes her medicine daily however she does remember she forgot to take it  today.  She denies any changes in her weight or fatigue.    She has not had chest x-ray and does not want to know the results of they are cancerous as she does not want to worry and she will not receive any treatment for lung cancer.  She refuses low-dose CT for lung cancer screening.      Social History     Tobacco Use   • Smoking status: Current Some Day Smoker     Types: Cigarettes   • Smokeless tobacco: Never Used   Substance Use Topics   • Alcohol use: Yes     Alcohol/week: 0.6 oz     Types: 1 Glasses of wine per week   • Drug use: No       The following portions of the patient's history were reviewed and updated as appropriate: allergies, current medications, past family history, past medical history, past social history, past surgical history and problem list.    Review of Systems   Constitutional: Negative for activity change, appetite change, chills, fatigue, fever and unexpected weight change.   HENT: Positive for congestion (Intermittent), postnasal drip (Intermittent) and rhinorrhea (Intermittent). Negative for ear discharge, ear pain, sinus pressure, sinus pain, sore throat and trouble swallowing.    Eyes: Negative for discharge and itching.   Respiratory: Positive for cough, shortness of breath and wheezing (Sometimes notices herself wheezing).    Cardiovascular: Negative for chest pain and palpitations.   Gastrointestinal: Negative for abdominal pain, blood in stool, constipation, diarrhea, nausea and vomiting.   Endocrine: Negative for cold intolerance and heat intolerance.   Genitourinary: Positive for urgency. Negative for difficulty urinating and frequency.   Musculoskeletal: Negative for arthralgias and myalgias.   Allergic/Immunologic: Positive for environmental allergies.   Neurological: Negative for dizziness, weakness and headaches.   Psychiatric/Behavioral: Positive for dysphoric mood. Negative for sleep disturbance. The patient is not nervous/anxious.        Objective   Blood pressure  102/62, pulse 75, temperature 98.2 °F (36.8 °C), temperature source Oral, resp. rate 16, weight 92.1 kg (203 lb), SpO2 94 %, not currently breastfeeding.    Physical Exam   Constitutional: She is oriented to person, place, and time. She appears well-developed and well-nourished. No distress.   HENT:   Head: Normocephalic and atraumatic.   Right Ear: Tympanic membrane, external ear and ear canal normal.   Left Ear: Tympanic membrane, external ear and ear canal normal.   Mouth/Throat: Uvula is midline and oropharynx is clear and moist.   Right TM with scar tissue   Eyes: Conjunctivae and EOM are normal. Pupils are equal, round, and reactive to light. Right eye exhibits no discharge. Left eye exhibits no discharge.   Neck: Neck supple. No thyromegaly present.   Cardiovascular: Normal rate, regular rhythm and intact distal pulses.   Pulmonary/Chest: Effort normal. She has wheezes. She has no rales.   Bilateral breath sounds slightly decreased throughout with scattered expiratory wheezes in upper fields.  Post mini neb no wheezes noted and patient does report improved breathing and her breath sounds are improved.   Abdominal: Soft. Bowel sounds are normal. There is no hepatosplenomegaly. There is no tenderness.   Musculoskeletal: She exhibits no deformity.   Gait smooth and steady   Lymphadenopathy:     She has no cervical adenopathy.   Neurological: She is alert and oriented to person, place, and time. No cranial nerve deficit.   Skin: Skin is warm and dry.   Psychiatric: She has a normal mood and affect.   Became tearful when talking about her spouse, but seems to be able to put in perspective and discuss happy memories.  Denies any suicide ideation or plan.  She does think that if she develops a terrible disease such as lung cancer she does not want to be treated.   Nursing note and vitals reviewed.      Assessment   Problem List Items Addressed This Visit        Endocrine    Hypothyroidism (acquired)        Musculoskeletal and Integument    OAB (overactive bladder)    Relevant Medications    oxybutynin XL (DITROPAN-XL) 5 MG 24 hr tablet       Other    Reactive depression      Other Visit Diagnoses     Dyspnea on exertion    -  Primary    Relevant Medications    ipratropium-albuterol (DUO-NEB) nebulizer solution 3 mL (Completed)    Other Relevant Orders    CBC & Differential    Comprehensive Metabolic Panel    XR Chest PA & Lateral    Routine adult health maintenance        Relevant Orders    CBC & Differential    Comprehensive Metabolic Panel    TSH           Procedures PHQ-9 Total Score: 8  DENZEL 7 Total Score: 7             Impression and Plan: She is improved with mini neb.  We looked at her previous albuterol which she had on file which had  in  which she carries in her purse.  She is not sure why she was prescribed it but does carried around with her.  She does not think is been very effective.  Most likely was not effective due to improper use and .  I will send her a new prescription with spacer and we discussed how and when to use it.  I am going to get a chest x-ray today to the evaluate for COPD.  She may need a controller inhaler.  We will add Singulair due to her multiple allergies and see if this helps with her breathing.  We did discuss making sure she uses the albuterol prior to activities that she may exert herself or on days when there are air quality alerts.    We will go ahead and get blood work today since she does not come to the PCP often and she has not had blood work in the past year.      I will refill her oxybutynin.  It seems to be controlling her OAB as well as can be expected.  We did discuss referrals which she does not want as well as management of OAB.      I will check TSH although she missed her dose today she takes it normally daily.  Her thyroid has been well controlled in the past.    I spent a great deal of time sitting with her as she cried and reminisced about her  spouse.  She seems to be handling the loss as well as can be expected.  We discussed using this and building support systems.  She reinforced that she does not want any additional medications for anxiety or depression.    I would like to have her back for Medicare wellness as well as vaccines and to discuss preventative maintenance which she has not done.      Health Maintenance Due   Topic Date Due   • TDAP/TD VACCINES (1 - Tdap) 04/07/1968   • ZOSTER VACCINE (1 of 2) 04/07/1999   • HEPATITIS C SCREENING  03/24/2016   • MEDICARE ANNUAL WELLNESS  03/24/2016   • COLONOSCOPY  03/24/2016   • PNEUMOCOCCAL VACCINES (65+ LOW/MEDIUM RISK) (2 of 2 - PPSV23) 10/05/2018              EMR Dragon/Transcription disclaimer:   Much of this encounter note is an electronic transcription/translation of spoken language to printed text. The electronic translation of spoken language may permit erroneous, or at times, nonsensical words or phrases to be inadvertently transcribed; Although I have reviewed the note for such errors, some may still exist.

## 2019-07-17 ENCOUNTER — APPOINTMENT (OUTPATIENT)
Dept: LAB | Facility: HOSPITAL | Age: 69
End: 2019-07-17

## 2019-07-17 ENCOUNTER — HOSPITAL ENCOUNTER (OUTPATIENT)
Dept: GENERAL RADIOLOGY | Facility: HOSPITAL | Age: 69
Discharge: HOME | End: 2019-07-17
Admitting: NURSE PRACTITIONER

## 2019-07-17 LAB
ALBUMIN SERPL-MCNC: 4.3 G/DL (ref 3.5–5.2)
ALBUMIN/GLOB SERPL: 1.7 G/DL
ALP SERPL-CCNC: 96 U/L (ref 39–117)
ALT SERPL W P-5'-P-CCNC: 12 U/L (ref 1–33)
ANION GAP SERPL CALCULATED.3IONS-SCNC: 11.4 MMOL/L (ref 5–15)
AST SERPL-CCNC: 14 U/L (ref 1–32)
BASOPHILS # BLD AUTO: 0.03 10*3/MM3 (ref 0–0.2)
BASOPHILS NFR BLD AUTO: 0.6 % (ref 0–1.5)
BILIRUB SERPL-MCNC: 0.3 MG/DL (ref 0.1–1.2)
BUN BLD-MCNC: 14 MG/DL (ref 8–23)
BUN/CREAT SERPL: 14.6 (ref 7–25)
CALCIUM SPEC-SCNC: 9.7 MG/DL (ref 8.6–10.5)
CHLORIDE SERPL-SCNC: 103 MMOL/L (ref 98–107)
CO2 SERPL-SCNC: 29.6 MMOL/L (ref 22–29)
CREAT BLD-MCNC: 0.96 MG/DL (ref 0.57–1)
DEPRECATED RDW RBC AUTO: 52.3 FL (ref 37–54)
EOSINOPHIL # BLD AUTO: 0.12 10*3/MM3 (ref 0–0.4)
EOSINOPHIL NFR BLD AUTO: 2.5 % (ref 0.3–6.2)
ERYTHROCYTE [DISTWIDTH] IN BLOOD BY AUTOMATED COUNT: 14.3 % (ref 12.3–15.4)
GFR SERPL CREATININE-BSD FRML MDRD: 57 ML/MIN/1.73
GLOBULIN UR ELPH-MCNC: 2.5 GM/DL
GLUCOSE BLD-MCNC: 93 MG/DL (ref 65–99)
HCT VFR BLD AUTO: 47.7 % (ref 34–46.6)
HGB BLD-MCNC: 15.2 G/DL (ref 12–15.9)
IMM GRANULOCYTES # BLD AUTO: 0.01 10*3/MM3 (ref 0–0.05)
IMM GRANULOCYTES NFR BLD AUTO: 0.2 % (ref 0–0.5)
LYMPHOCYTES # BLD AUTO: 1.6 10*3/MM3 (ref 0.7–3.1)
LYMPHOCYTES NFR BLD AUTO: 33.5 % (ref 19.6–45.3)
MCH RBC QN AUTO: 31.5 PG (ref 26.6–33)
MCHC RBC AUTO-ENTMCNC: 31.9 G/DL (ref 31.5–35.7)
MCV RBC AUTO: 98.8 FL (ref 79–97)
MONOCYTES # BLD AUTO: 0.66 10*3/MM3 (ref 0.1–0.9)
MONOCYTES NFR BLD AUTO: 13.8 % (ref 5–12)
NEUTROPHILS # BLD AUTO: 2.35 10*3/MM3 (ref 1.7–7)
NEUTROPHILS NFR BLD AUTO: 49.4 % (ref 42.7–76)
NRBC BLD AUTO-RTO: 0 /100 WBC (ref 0–0.2)
PLATELET # BLD AUTO: 192 10*3/MM3 (ref 140–450)
PMV BLD AUTO: 9.8 FL (ref 6–12)
POTASSIUM BLD-SCNC: 4.4 MMOL/L (ref 3.5–5.2)
PROT SERPL-MCNC: 6.8 G/DL (ref 6–8.5)
RBC # BLD AUTO: 4.83 10*6/MM3 (ref 3.77–5.28)
SODIUM BLD-SCNC: 144 MMOL/L (ref 136–145)
TSH SERPL DL<=0.05 MIU/L-ACNC: 1.68 MIU/ML (ref 0.27–4.2)
WBC NRBC COR # BLD: 4.77 10*3/MM3 (ref 3.4–10.8)

## 2019-07-17 PROCEDURE — 85025 COMPLETE CBC W/AUTO DIFF WBC: CPT | Performed by: NURSE PRACTITIONER

## 2019-07-17 PROCEDURE — 36415 COLL VENOUS BLD VENIPUNCTURE: CPT | Performed by: NURSE PRACTITIONER

## 2019-07-17 PROCEDURE — 80053 COMPREHEN METABOLIC PANEL: CPT | Performed by: NURSE PRACTITIONER

## 2019-07-17 PROCEDURE — 84443 ASSAY THYROID STIM HORMONE: CPT | Performed by: NURSE PRACTITIONER

## 2019-07-17 PROCEDURE — 71046 X-RAY EXAM CHEST 2 VIEWS: CPT

## 2019-07-17 RX ORDER — BUPROPION HYDROCHLORIDE 150 MG/1
150 TABLET ORAL EVERY MORNING
Qty: 90 TABLET | Refills: 3 | Status: SHIPPED | OUTPATIENT
Start: 2019-07-17 | End: 2020-09-08 | Stop reason: SDUPTHER

## 2019-07-17 RX ORDER — LEVOTHYROXINE SODIUM 0.1 MG/1
100 TABLET ORAL DAILY
Qty: 90 TABLET | Refills: 1 | Status: SHIPPED | OUTPATIENT
Start: 2019-07-17 | End: 2020-04-27

## 2019-07-17 NOTE — PROGRESS NOTES
Spoke in detail with Patient about results, patient expressed understanding and will follow up as agreed.     Any pending Labs and/or Diagnostic procedures required have been ordered for future release.    Pending result on Xray    Beronica CMA

## 2019-07-19 NOTE — PROGRESS NOTES
Spoke in detail with Patient about results, patient expressed understanding and will follow up as agreed.     Any pending Labs and/or Diagnostic procedures required have been ordered for future release.    Beronica CHONG

## 2019-09-11 ENCOUNTER — OFFICE VISIT (OUTPATIENT)
Dept: FAMILY MEDICINE CLINIC | Facility: CLINIC | Age: 69
End: 2019-09-11

## 2019-09-11 VITALS
HEIGHT: 66 IN | OXYGEN SATURATION: 96 % | HEART RATE: 104 BPM | BODY MASS INDEX: 32.78 KG/M2 | DIASTOLIC BLOOD PRESSURE: 64 MMHG | WEIGHT: 204 LBS | RESPIRATION RATE: 18 BRPM | SYSTOLIC BLOOD PRESSURE: 116 MMHG | TEMPERATURE: 97.6 F

## 2019-09-11 DIAGNOSIS — Z23 NEED FOR VACCINATION: ICD-10-CM

## 2019-09-11 DIAGNOSIS — N39.0 URINARY TRACT INFECTION WITH HEMATURIA, SITE UNSPECIFIED: Primary | ICD-10-CM

## 2019-09-11 DIAGNOSIS — R31.9 URINARY TRACT INFECTION WITH HEMATURIA, SITE UNSPECIFIED: Primary | ICD-10-CM

## 2019-09-11 LAB
BILIRUB BLD-MCNC: ABNORMAL MG/DL
CLARITY, POC: ABNORMAL
COLOR UR: ABNORMAL
GLUCOSE UR STRIP-MCNC: ABNORMAL MG/DL
KETONES UR QL: ABNORMAL
LEUKOCYTE EST, POC: ABNORMAL
NITRITE UR-MCNC: POSITIVE MG/ML
PH UR: 5 [PH] (ref 5–8)
PROT UR STRIP-MCNC: ABNORMAL MG/DL
RBC # UR STRIP: ABNORMAL /UL
SP GR UR: 1.02 (ref 1–1.03)
UROBILINOGEN UR QL: NORMAL

## 2019-09-11 PROCEDURE — G0009 ADMIN PNEUMOCOCCAL VACCINE: HCPCS | Performed by: NURSE PRACTITIONER

## 2019-09-11 PROCEDURE — 81003 URINALYSIS AUTO W/O SCOPE: CPT | Performed by: NURSE PRACTITIONER

## 2019-09-11 PROCEDURE — 90662 IIV NO PRSV INCREASED AG IM: CPT | Performed by: NURSE PRACTITIONER

## 2019-09-11 PROCEDURE — 99213 OFFICE O/P EST LOW 20 MIN: CPT | Performed by: NURSE PRACTITIONER

## 2019-09-11 PROCEDURE — G0008 ADMIN INFLUENZA VIRUS VAC: HCPCS | Performed by: NURSE PRACTITIONER

## 2019-09-11 PROCEDURE — 90732 PPSV23 VACC 2 YRS+ SUBQ/IM: CPT | Performed by: NURSE PRACTITIONER

## 2019-09-11 RX ORDER — PHENAZOPYRIDINE HYDROCHLORIDE 100 MG/1
100 TABLET, FILM COATED ORAL 3 TIMES DAILY PRN
Qty: 6 TABLET | Refills: 0 | Status: SHIPPED | OUTPATIENT
Start: 2019-09-11 | End: 2020-07-01

## 2019-09-11 RX ORDER — CIPROFLOXACIN 250 MG/1
250 TABLET, FILM COATED ORAL 2 TIMES DAILY
Qty: 14 TABLET | Refills: 0 | Status: SHIPPED | OUTPATIENT
Start: 2019-09-11 | End: 2019-09-18

## 2019-09-11 NOTE — PROGRESS NOTES
"Subjective   Sharon Gonzalez is a 69 y.o. female.     Chief Complaint   Patient presents with   • Urinary Tract Infection     burning, frequent urination,       HPI  Here for dysuria and bladder spasms that started Saturday.  She had a baby shower to go to so she took some leftover pyridium and drank a lot of water and this relieved sx for awhile.  Chills Sunday night, but did not check temperature.  Has return of dysuria and bladder spasms which are worsening. Has had several recent UTIs with e. Coli which were treated and resolved.  Takes oxybutynin for urgency but doesn't work very well.       Social History     Tobacco Use   • Smoking status: Current Some Day Smoker     Types: Cigarettes   • Smokeless tobacco: Never Used   Substance Use Topics   • Alcohol use: Yes     Alcohol/week: 0.6 oz     Types: 1 Glasses of wine per week   • Drug use: No       The following portions of the patient's history were reviewed and updated as appropriate: allergies, current medications, past family history, past medical history, past social history, past surgical history and problem list.    Review of Systems   Constitutional: Positive for chills. Negative for fatigue and fever.   Respiratory: Negative for cough and shortness of breath.    Cardiovascular: Negative for chest pain and palpitations.   Gastrointestinal: Positive for abdominal pain, constipation and nausea (yesterday). Negative for blood in stool, diarrhea and vomiting.   Genitourinary: Positive for dysuria, frequency, hematuria and urgency. Negative for decreased urine volume, difficulty urinating, flank pain and vaginal discharge.   Musculoskeletal: Negative for arthralgias and myalgias.   Neurological: Negative for dizziness and weakness.       Objective   Blood pressure 116/64, pulse 104, temperature 97.6 °F (36.4 °C), resp. rate 18, height 167.6 cm (66\"), weight 92.5 kg (204 lb), SpO2 96 %, not currently breastfeeding.    Physical Exam   Constitutional: She is " oriented to person, place, and time. She appears well-developed and well-nourished. No distress.   HENT:   Head: Normocephalic and atraumatic.   Mouth/Throat: Oropharynx is clear and moist.   Eyes: Conjunctivae are normal. Right eye exhibits no discharge. Left eye exhibits no discharge.   Cardiovascular: Normal rate, regular rhythm and normal heart sounds.   Pulmonary/Chest: Effort normal and breath sounds normal.   Abdominal: Soft. Bowel sounds are normal. There is no tenderness. There is no CVA tenderness.   No suprapubic TTP   Musculoskeletal: She exhibits no deformity.   Gait smooth and steady   Neurological: She is alert and oriented to person, place, and time.   Skin: Skin is warm and dry. She is not diaphoretic.   Psychiatric: She has a normal mood and affect.   Nursing note and vitals reviewed.      Assessment   Problem List Items Addressed This Visit     None      Visit Diagnoses     Urinary tract infection with hematuria, site unspecified    -  Primary    Relevant Orders    POC Urinalysis Dipstick, Automated    Urine Culture - Urine, Urine, Clean Catch    Need for vaccination        Relevant Orders    Pneumococcal Polysaccharide Vaccine 23-Valent Greater Than or Equal To 3yo Subcutaneous / IM    Fluzone High Dose =>65Years           Procedures           Impression and Plan:  UA shows blood, leuks, nitrite +.  Most likely e. Coli again. But will send for cx.  She does not want bactrim, macrobid as she thinks they are too big to swallow easily.  Did well with Cipro-black box warning discussed. Will give pyridium for bladder spasms.    Concerned for recent increase in UTIs.  We discussed stopping the oxybutynin since it is not very helpful and may lessen incidence UTIs.    We discussed s/s requiring emergent tx.         Health Maintenance Due   Topic Date Due   • HEPATITIS C SCREENING  03/24/2016   • MEDICARE ANNUAL WELLNESS  03/24/2016   • COLONOSCOPY  03/24/2016   • MAMMOGRAM  08/10/2019              EMR  Dragon/Transcription disclaimer:   Much of this encounter note is an electronic transcription/translation of spoken language to printed text. The electronic translation of spoken language may permit erroneous, or at times, nonsensical words or phrases to be inadvertently transcribed; Although I have reviewed the note for such errors, some may still exist.

## 2019-09-14 LAB
BACTERIA UR CULT: ABNORMAL
BACTERIA UR CULT: ABNORMAL
OTHER ANTIBIOTIC SUSC ISLT: ABNORMAL

## 2019-09-16 DIAGNOSIS — N39.0 RECURRENT URINARY TRACT INFECTION: Primary | ICD-10-CM

## 2019-09-25 ENCOUNTER — LAB (OUTPATIENT)
Dept: LAB | Facility: HOSPITAL | Age: 69
End: 2019-09-25

## 2019-09-25 DIAGNOSIS — N39.0 RECURRENT URINARY TRACT INFECTION: ICD-10-CM

## 2019-09-25 PROCEDURE — 87086 URINE CULTURE/COLONY COUNT: CPT | Performed by: NURSE PRACTITIONER

## 2019-09-26 LAB — BACTERIA SPEC AEROBE CULT: NO GROWTH

## 2020-04-27 ENCOUNTER — TELEPHONE (OUTPATIENT)
Dept: FAMILY MEDICINE CLINIC | Facility: CLINIC | Age: 70
End: 2020-04-27

## 2020-04-27 RX ORDER — LEVOTHYROXINE SODIUM 0.1 MG/1
TABLET ORAL
Qty: 90 TABLET | Refills: 0 | Status: SHIPPED | OUTPATIENT
Start: 2020-04-27 | End: 2020-08-03

## 2020-04-27 NOTE — TELEPHONE ENCOUNTER
PATIENT WOULD LIKE TO ORDER INOGEN MACHINE. ITS A PORTABLE OXYGEN MACHINE. WOULD LIKE IT ORDERED BECAUSE SHE CANNOT BREATH WITH FACE MASK ON. SAYS IT IVET HER. SHE HAS ALSO BEEN TRYING TO WALK AND EXERCISE BECAUSE SHE GETS REAL SHORT OF BREATH. ALSO STATES THEIR IS A SPECIAL ON IT SO THIS IS THE OPTIMAL TIME TO GET IT. INSURANCE WONT PAY FOR IT BUT SHE NEEDS DOCTOR APPROVAL.    PATIENT ALSO CALLING TO REFILL:  - levothyroxine (SYNTHROID, LEVOTHROID) 100 MCG tablet    VERIFIED [facility] ON [facility].    PATIENT CALL BACK [phone].

## 2020-04-28 RX ORDER — LEVOTHYROXINE SODIUM 0.1 MG/1
100 TABLET ORAL DAILY
Qty: 90 TABLET | Refills: 0 | OUTPATIENT
Start: 2020-04-28

## 2020-05-07 ENCOUNTER — OFFICE VISIT (OUTPATIENT)
Dept: FAMILY MEDICINE CLINIC | Facility: CLINIC | Age: 70
End: 2020-05-07

## 2020-05-07 VITALS
HEART RATE: 81 BPM | OXYGEN SATURATION: 91 % | BODY MASS INDEX: 32.14 KG/M2 | SYSTOLIC BLOOD PRESSURE: 100 MMHG | HEIGHT: 66 IN | DIASTOLIC BLOOD PRESSURE: 61 MMHG | TEMPERATURE: 97.7 F | WEIGHT: 200 LBS

## 2020-05-07 DIAGNOSIS — R06.09 DYSPNEA ON EXERTION: Primary | ICD-10-CM

## 2020-05-07 PROCEDURE — 99214 OFFICE O/P EST MOD 30 MIN: CPT | Performed by: NURSE PRACTITIONER

## 2020-05-07 RX ORDER — ALBUTEROL SULFATE 2.5 MG/3ML
2.5 SOLUTION RESPIRATORY (INHALATION) EVERY 4 HOURS PRN
Qty: 50 VIAL | Refills: 12 | Status: SHIPPED | OUTPATIENT
Start: 2020-05-07

## 2020-05-07 RX ORDER — ALBUTEROL SULFATE 90 UG/1
2 AEROSOL, METERED RESPIRATORY (INHALATION) EVERY 4 HOURS PRN
Qty: 1 INHALER | Refills: 2 | Status: SHIPPED | OUTPATIENT
Start: 2020-05-07 | End: 2022-02-17 | Stop reason: SDUPTHER

## 2020-05-07 NOTE — PROGRESS NOTES
"Nyasia Gonzalez is a 70 y.o. female   who presents for   Chief Complaint   Patient presents with   • Shortness of Breath     years     Hx of smoking, 50 years, 1 ppd   Short of breath with wearing mask  Short of breath walking hallway, was wearing mask  Trying to walk with dog since quarantine, monitors oxygen at home, typically 94-95%, back in October down as low as 82% but rebounds. No confirmed diagnosis of copd. Does have wheezing and shortness of breath with activity. States her face mask is making it much worse. Interested in home oxygen, she wants a small concentrator she is willing to pay out of pocket to help her symptoms.    Pulse ox 94-95 usually at home  In October,     This is a new patient/problem to this provider.    /61   Pulse 81   Temp 97.7 °F (36.5 °C)   Ht 167.6 cm (66\")   Wt 90.7 kg (200 lb)   SpO2 91%   BMI 32.28 kg/m²       History of Present Illness   Shortness of Breath   This is a recurrent problem. The current episode started more than 1 month ago. The problem occurs intermittently. The problem has been waxing and waning. Associated symptoms include wheezing. Pertinent negatives include no abdominal pain, chest pain, claudication, coryza, ear pain, fever, headaches, hemoptysis, leg pain, leg swelling, neck pain, orthopnea, PND, rash, rhinorrhea, sore throat, sputum production, swollen glands, syncope or vomiting. The symptoms are aggravated by any activity. She has tried cool air for the symptoms. The treatment provided moderate relief. Her past medical history is significant for allergies. COPD: possible copd per patient, smoking hx.       The following portions of the patient's history were reviewed and updated as appropriate: allergies, current medications, past family history, past medical history, past social history, past surgical history and problem list.    Review of Systems  Review of Systems   Constitutional: Negative for fever.   HENT: Negative for ear " pain, rhinorrhea and sore throat.    Respiratory: Positive for shortness of breath and wheezing. Negative for hemoptysis and sputum production.    Cardiovascular: Negative for chest pain, orthopnea, claudication, leg swelling, syncope and PND.   Gastrointestinal: Negative for abdominal pain and vomiting.   Musculoskeletal: Negative for neck pain.   Skin: Negative for rash.   Neurological: Negative for headaches.       Objective   Physical Exam  Physical Exam   Constitutional: She is oriented to person, place, and time. She appears well-developed and well-nourished. No distress.   HENT:   Head: Normocephalic and atraumatic.   Right Ear: External ear normal.   Left Ear: External ear normal.   Nose: Nose normal.   Mouth/Throat: Oropharynx is clear and moist. No oropharyngeal exudate.   Eyes: Conjunctivae are normal.   Neck: Neck supple.   Cardiovascular: Normal rate, regular rhythm and normal heart sounds. Exam reveals no gallop and no friction rub.   No murmur heard.  Pulmonary/Chest: Effort normal. No respiratory distress. She has wheezes. She has no rales.   Neurological: She is alert and oriented to person, place, and time.   Skin: Skin is warm and dry. She is not diaphoretic.   Psychiatric: She has a normal mood and affect.         Assessment/Plan   Sharon was seen today for shortness of breath.    Diagnoses and all orders for this visit:    Dyspnea on exertion  -     Home Nebulizer  -     albuterol sulfate HFA (ProAir HFA) 108 (90 Base) MCG/ACT inhaler; Inhale 2 puffs Every 4 (Four) Hours As Needed for Wheezing or Shortness of Air. May sub for insurance approved equivalent. provide spacer.    Other orders  -     albuterol (PROVENTIL) (2.5 MG/3ML) 0.083% nebulizer solution; Take 2.5 mg by nebulization Every 4 (Four) Hours As Needed for Wheezing.    recommend albuterol nebulized treatments as needed, every 6 hours, may use inhaler when out  Recommend use prior to increased activity including gardening/walking  etc.  Discussed concern with oxygen administration if diagnosis of COPD and that it could worsen her symptoms  Would like to hold on oxygen, obtain pft's in the next several months  Recommend follow up with Gabrielle    During office visit, oxygen was 91% upon entering exam room, while ambulating in the hallway for two minutes, oxygen decreased to 88% but quickly rebounded to 92-94%  patient wearing a mask during activity

## 2020-07-01 ENCOUNTER — OFFICE VISIT (OUTPATIENT)
Dept: FAMILY MEDICINE CLINIC | Facility: CLINIC | Age: 70
End: 2020-07-01

## 2020-07-01 VITALS
BODY MASS INDEX: 34.01 KG/M2 | HEIGHT: 66 IN | HEART RATE: 76 BPM | TEMPERATURE: 97.3 F | WEIGHT: 211.6 LBS | SYSTOLIC BLOOD PRESSURE: 120 MMHG | DIASTOLIC BLOOD PRESSURE: 70 MMHG | OXYGEN SATURATION: 93 %

## 2020-07-01 DIAGNOSIS — E03.9 HYPOTHYROIDISM (ACQUIRED): ICD-10-CM

## 2020-07-01 DIAGNOSIS — R53.83 FATIGUE, UNSPECIFIED TYPE: ICD-10-CM

## 2020-07-01 DIAGNOSIS — R06.02 SHORTNESS OF BREATH: ICD-10-CM

## 2020-07-01 DIAGNOSIS — N32.81 OAB (OVERACTIVE BLADDER): ICD-10-CM

## 2020-07-01 DIAGNOSIS — Z00.00 MEDICARE ANNUAL WELLNESS VISIT, SUBSEQUENT: Primary | ICD-10-CM

## 2020-07-01 DIAGNOSIS — F32.9 REACTIVE DEPRESSION: ICD-10-CM

## 2020-07-01 PROCEDURE — 99213 OFFICE O/P EST LOW 20 MIN: CPT | Performed by: NURSE PRACTITIONER

## 2020-07-01 PROCEDURE — G0439 PPPS, SUBSEQ VISIT: HCPCS | Performed by: NURSE PRACTITIONER

## 2020-07-01 RX ORDER — MONTELUKAST SODIUM 10 MG/1
10 TABLET ORAL NIGHTLY
Qty: 90 TABLET | Refills: 1 | Status: SHIPPED | OUTPATIENT
Start: 2020-07-01 | End: 2021-06-23

## 2020-07-01 NOTE — PROGRESS NOTES
"Subjective   Sharon Gonzalez is a 70 y.o. female.     Chief Complaint   Patient presents with   • Medicare Wellness-subsequent      HPI  Here for AWV but is also concerned about new fatigue.  She is tired all the time and makes it difficult to get ADLs done.  She is struggling with some loneliness and loss of purpose due to COVID and not being able to go anywhere.  Does not think it is depression but more a lack of direction.  Sleep is disjointed-sometimes sleeps during the day and then cant sleep at night. Dog is only real company.  Sometimes gets sad about selling her house. Takes wellbutrin for \"energy\" but doesn't think it helps.     Hypothyroid:  Taking and alec thyroid meds without side effects.     Dyspnea:  Albuterol inhaler is helpful and she uses once or twice per day prn.  Has MN machine but not used it recently.  Taking singulair at HS and thinks it is helpful.     OAB:  Wears poise pads-but dribbles all the time and thinks it sometimes wakes her up when she is wet.  Had been on meds in past but stopped all due to frequent UTIs. Does not want to take anything now.     Has been eating more out of boredom and not able to get out as much as before or interact with friends.  Plays word games on computer.  Walks her dog sometimes, but usually just lets him out.  Had one fall to her knees when taking out trash and tripped by dog.  She did not injure herself and did not seek treatment-just scraped her knee.  Got a home monitoring device in case she falls again.  Neighbors check on her.  Brother will be coming to live from [place] soon.    Social History     Tobacco Use   • Smoking status: Current Some Day Smoker     Types: Cigarettes   • Smokeless tobacco: Never Used   Substance Use Topics   • Alcohol use: Yes     Alcohol/week: 1.0 standard drinks     Types: 1 Glasses of wine per week   • Drug use: No       The following portions of the patient's history were reviewed and updated as appropriate: allergies, " "current medications, past family history, past medical history, past social history, past surgical history and problem list.    Review of Systems   Constitutional: Positive for activity change and appetite change. Negative for chills, fever and unexpected weight change.   HENT: Negative for congestion, ear pain, hearing loss, rhinorrhea, sore throat and trouble swallowing.    Eyes: Negative for pain and visual disturbance.   Respiratory: Positive for shortness of breath. Negative for cough, chest tightness and wheezing.    Cardiovascular: Negative for chest pain, palpitations and leg swelling.   Gastrointestinal: Negative for abdominal pain, blood in stool, constipation, diarrhea, nausea and vomiting.   Endocrine: Negative for cold intolerance and heat intolerance.   Genitourinary: Positive for urgency. Negative for difficulty urinating, dysuria, hematuria, vaginal bleeding and vaginal discharge.   Musculoskeletal: Negative for arthralgias, gait problem and joint swelling.   Skin: Negative for rash and wound.   Allergic/Immunologic: Positive for environmental allergies.   Neurological: Negative for dizziness, weakness, numbness and headaches.   Hematological: Does not bruise/bleed easily.   Psychiatric/Behavioral: Positive for decreased concentration, dysphoric mood and sleep disturbance. The patient is not nervous/anxious.        Objective   Blood pressure 120/70, pulse 76, temperature 97.3 °F (36.3 °C), temperature source Temporal, height 167.6 cm (65.98\"), weight 96 kg (211 lb 9.6 oz), SpO2 93 %, not currently breastfeeding.  Body mass index is 34.17 kg/m².    Physical Exam   Constitutional: She is oriented to person, place, and time. She appears well-developed and well-nourished. No distress.   HENT:   Head: Normocephalic and atraumatic.   Right Ear: External ear and ear canal normal.   Left Ear: Tympanic membrane, external ear and ear canal normal.   Mouth/Throat: Uvula is midline and oropharynx is clear and " moist.   Dentition OK   Eyes: Pupils are equal, round, and reactive to light. Conjunctivae and EOM are normal. Right eye exhibits no discharge. Left eye exhibits no discharge.   Neck: Neck supple. No thyromegaly present.   Cardiovascular: Normal rate and regular rhythm.   Pulmonary/Chest: Effort normal and breath sounds normal.   Little decreased b/l bases   Abdominal: Soft. Bowel sounds are normal. There is no tenderness.   Musculoskeletal: She exhibits no deformity.   Gait smooth and steady   Lymphadenopathy:     She has no cervical adenopathy.   Neurological: She is alert and oriented to person, place, and time.   Skin: Skin is warm and dry. She is not diaphoretic.   Psychiatric: She has a normal mood and affect. Her behavior is normal. Judgment and thought content normal.   Became a little tearful discussing moving to new home and packing up memories  Misses human contact and touch   Nursing note and vitals reviewed.      Assessment   Problem List Items Addressed This Visit        Endocrine    Hypothyroidism (acquired)       Genitourinary    OAB (overactive bladder)       Other    Reactive depression    Relevant Orders    CBC & Differential    TSH      Other Visit Diagnoses     Medicare annual wellness visit, subsequent    -  Primary    Fatigue, unspecified type        Relevant Orders    CBC & Differential    TSH    Comprehensive Metabolic Panel    Shortness of breath        Relevant Medications    montelukast (Singulair) 10 MG tablet    Other Relevant Orders    CBC & Differential           Procedures           Impression and Plan:  Will get labs for fatigue.  Could be thyroid needs adjustment.  Cbc for anemia.  Think fatigue more likely due to underlying loneliness and dysphoria.  When we discussed that wellbutrin is for mood she did not want to stop it.   Dyspnea:  Discussed using albuterol prior to activities.  Will give some samples of Trelegy and instructions for use to see if this is helpful.  Not ready  tro discuss smoking cessation. Cont singulair.  We discussed s/s requiring emergent tx.     Recommend having friends over for dinner to her patio.  They can bring own meals and sit 6 feet apart outside which should limit exposure.  Try zoom dinners.      Diet and exercise:  Try to focus on eating real foods-avoid processed foods when possible.  Set goal to walk dog BID which will also help to structure her day.      Sleep:  Try to avoid napping during the day for more than 20-30 mins.  Increase activity.  Add structure to day.  Do not watch new all day.     OAB stable using pads.  Will continue this plan.  No recent UTIs.     She declines any more mammos as she would not treat breast cancer anyway.  She declines more colon cancer screenings.  Last was negative-cologuard in 2017.     She thinks she is UTD on vaccines other than Tdap and will check at her pharmacy for this.       Health Maintenance Due   Topic Date Due   • TDAP/TD VACCINES (1 - Tdap) 04/07/1961              EMR Dragon/Transcription disclaimer:   Much of this encounter note is an electronic transcription/translation of spoken language to printed text. The electronic translation of spoken language may permit erroneous, or at times, nonsensical words or phrases to be inadvertently transcribed; Although I have reviewed the note for such errors, some may still exist.

## 2020-07-01 NOTE — PATIENT INSTRUCTIONS
"Eating Plan for Brain Health  A healthy diet is an important part of overall wellness, and it may help to improve brain function. Eating a healthy diet can lower the risk of having memory-loss diseases such as Alzheimer disease or dementia, or it can slow down the progression of those diseases.  Depending on your overall health and any other health conditions you have, you may need to follow specific diet guidelines. Work with your health care provider or diet and nutrition specialist (dietitian) to find and follow an eating plan that is right for you.  What are tips for following this plan?  Reading food labels  · Check food labels for Daily Value (DV) percentages. Aim for a DV of 5% or less for:  ? Saturated fats.  ? Trans fats.  ? Cholesterol.  ? Salt (sodium).  · Choose whole grains instead of processed grains. The first ingredient in the ingredient list should include words like \"whole grain\" or \"whole wheat.\"  Shopping  · Before you go grocery shopping, plan your meals and make a shopping list.  · Look for lean meats, fish, low-fat dairy products, fruits and vegetables, and whole grains.  · Avoid buying processed or pre-made foods. These are higher in added sugar, fat, and sodium.  Cooking  · Use healthy oils such as olive oil, instead of butter or margarine.  · Avoid frying foods. Healthier ways of cooking include roasting, baking, poaching, and steaming.  · Keep in mind that many healthy foods can be prepared without cooking, such as tuna, nuts, beans, and fruits.  Meal planning  · Plan to eat one or more servings of each of these every day:  ? Green leafy vegetables.  ? Nuts.  ? Whole grains.  · Plan to eat berries, beans, fish, and poultry two or more times every week.  · Avoid red meats, butter, cheese, sweets, and fried foods.  · Eat several small meals throughout the day. For example, eat 6 small meals rather than 3 full-size meals.  General tips  · If you have difficulty chewing or " swallowing:  ? Choose foods that are tender, soft, and moist.  ? Avoid foods that are sticky, hard, dry, chewy, or crunchy.  ? Cut foods into pieces that are smaller than your thumbnail.  · If you are underweight:  ? Add extra protein or calories to meals by adding cream, nut butters, or protein powder to foods and drinks.  ? Drink nutritional supplement shakes as told by your health care provider or dietitian.  · Drink plenty of fluids to keep your urine pale yellow.  · Limit alcohol intake to no more than 1 drink a day for nonpregnant women and 2 drinks a day for men. One drink equals 12 oz of beer, 5 oz of wine, or 1½ oz of hard liquor.  · Take daily vitamin and mineral supplements as told by your health care provider or dietitian.  · Follow daily calorie and nutrient intake goals as told by your dietitian.  What foods are recommended?    · Foods that are high in omega-3s (omega-3 fatty acids). Omega-3s are often found in coldwater fish. They are also found in ground flaxseed, walnuts, edamame, and seaweed.  ? It is recommended that adults eat 8 oz (230 g) or more of fish or other seafood every week.  ? The best kinds of fish for omega-3s are wild salmon, albacore, tuna, sardines, and farmed trout.  ? It is important to bake or grill fish instead of frying it, to avoid unhealthy fats.  · Foods that contain vitamins C, D, and E. These foods include:  ? Avocados.  ? Beans.  ? Nuts and seeds.  ? Green leafy vegetables, like spinach and kale.  ? Cruciferous vegetables, like broccoli or [place] sprouts.  · Cherries and berries, especially blackberries and blueberries. Berries have antioxidants and nutrients that support memory.  · Whole grains, such as oatmeal, whole-grain cereal, whole-grain bread, brown rice, and barley soup.  · Herbs and spices. Cooking with certain herbs and spices, such as turmeric, can help you absorb vitamins.  · Foods in the Mediterranean diet or the DASH (Dietary Approaches to Stop  Hypertension) diet. Your health care provider may recommend eating a combination of these diets. These diets recommend that you:  ? Eat green leafy vegetables, nuts, and whole grains every day.  ? Drink one glass of wine a day.  ? Eat berries, beans, fish, and poultry two or more times every week.  ? Limit red meats, butter, cheese, sweets, fried foods, and fast food to once a week or less.  · Healthy breakfast foods, such as whole-grain cereal, low-fat yogurt, berries or vegetables, and nuts. Eating breakfast every day can boost brain function and concentration for people of all ages.  What foods are not recommended?  · Foods that are high in trans fats, including:  ? Fried foods.  ? Snack foods.  ? Pastries like cookies and donuts, especially the ones that come prepackaged.  · Foods that are high in saturated fats, including:  ? Processed meats, like sausage or deli meat.  ? Red meat.  ? Certain dairy products like butter, margarine, and certain cheeses.  · Processed grains, such as white bread.  · Sweets and fast food. Limit these types of food to once a week or less.  Summary  · Eating a nutritious diet can support brain health as well as overall wellness.  · Choose foods that are high in omega-3 fatty acids, vitamins, and whole grains.  · Avoid foods that contain trans fats and saturated fats, and limit sweets and fast food.  This information is not intended to replace advice given to you by your health care provider. Make sure you discuss any questions you have with your health care provider.  Document Released: 04/23/2018 Document Revised: 04/09/2020 Document Reviewed: 04/23/2018  [employer] Patient Education © 2020 [employer]

## 2020-07-01 NOTE — PROGRESS NOTES
The ABCs of the Annual Wellness Visit  Subsequent Medicare Wellness Visit    Chief Complaint   Patient presents with   • Medicare Wellness-subsequent       Subjective   History of Present Illness:  Sharon Gonzalez is a 70 y.o. female who presents for a Subsequent Medicare Wellness Visit.      HEALTH RISK ASSESSMENT    Recent Hospitalizations:  No hospitalization(s) within the last year.    Current Medical Providers:  Patient Care Team:  Gabrielle Rush APRN as PCP - General (Nurse Practitioner)    Smoking Status:  Social History     Tobacco Use   Smoking Status Current Some Day Smoker   • Types: Cigarettes   Smokeless Tobacco Never Used       Alcohol Consumption:  Social History     Substance and Sexual Activity   Alcohol Use Yes   • Alcohol/week: 1.0 standard drinks   • Types: 1 Glasses of wine per week       Depression Screen:   PHQ-2/PHQ-9 Depression Screening 7/15/2019   Little interest or pleasure in doing things 1   Feeling down, depressed, or hopeless 1   Trouble falling or staying asleep, or sleeping too much 2   Feeling tired or having little energy 2   Poor appetite or overeating 1   Feeling bad about yourself - or that you are a failure or have let yourself or your family down 0   Trouble concentrating on things, such as reading the newspaper or watching television 1   Moving or speaking so slowly that other people could have noticed. Or the opposite - being so fidgety or restless that you have been moving around a lot more than usual 0   Thoughts that you would be better off dead, or of hurting yourself in some way 0   Total Score 8   If you checked off any problems, how difficult have these problems made it for you to do your work, take care of things at home, or get along with other people? -       Fall Risk Screen:  STEADI Fall Risk Assessment was completed, and patient is at MODERATE risk for falls. Assessment completed on:7/1/2020    Health Habits and Functional and Cognitive  Screening:  Functional & Cognitive Status 7/1/2020   Do you have difficulty preparing food and eating? No   Do you have difficulty bathing yourself, getting dressed or grooming yourself? No   Do you have difficulty using the toilet? No   Do you have difficulty moving around from place to place? No   Do you have trouble with steps or getting out of a bed or a chair? No   Current Diet Unhealthy Diet   Dental Exam Up to date   Eye Exam Not up to date   Exercise (times per week) 0 times per week   Do you need help using the phone?  No   Are you deaf or do you have serious difficulty hearing?  No   Do you need help with transportation? No   Do you need help shopping? No   Do you need help preparing meals?  No   Do you need help with housework?  No   Do you need help with laundry? No   Do you need help taking your medications? No   Do you need help managing money? No   Do you ever drive or ride in a car without wearing a seat belt? No   Have you felt unusual stress, anger or loneliness in the last month? Yes   Who do you live with? Alone   If you need help, do you have trouble finding someone available to you? No   Have you been bothered in the last four weeks by sexual problems? No   Do you have difficulty concentrating, remembering or making decisions? No         Does the patient have evidence of cognitive impairment? No    Asprin use counseling:Does not need ASA (and currently is not on it)    Age-appropriate Screening Schedule:  Refer to the list below for future screening recommendations based on patient's age, sex and/or medical conditions. Orders for these recommended tests are listed in the plan section. The patient has been provided with a written plan.    Health Maintenance   Topic Date Due   • TDAP/TD VACCINES (1 - Tdap) 04/07/1961   • INFLUENZA VACCINE  08/01/2020   • MAMMOGRAM  07/01/2022   • COLONOSCOPY  07/01/2030   • ZOSTER VACCINE  Discontinued          The following portions of the patient's history were  reviewed and updated as appropriate: allergies, current medications, past family history, past medical history, past social history, past surgical history and problem list.    Outpatient Medications Prior to Visit   Medication Sig Dispense Refill   • albuterol (PROVENTIL) (2.5 MG/3ML) 0.083% nebulizer solution Take 2.5 mg by nebulization Every 4 (Four) Hours As Needed for Wheezing. 50 vial 12   • albuterol sulfate HFA (ProAir HFA) 108 (90 Base) MCG/ACT inhaler Inhale 2 puffs Every 4 (Four) Hours As Needed for Wheezing or Shortness of Air. May sub for insurance approved equivalent. provide spacer. 1 inhaler 2   • B Complex Vitamins (B COMPLEX-B12) tablet Take  by mouth.     • buPROPion XL (WELLBUTRIN XL) 150 MG 24 hr tablet Take 1 tablet by mouth Every Morning. 90 tablet 3   • Cholecalciferol (VITAMIN D) 2000 UNITS tablet Take  by mouth.     • levothyroxine (SYNTHROID, LEVOTHROID) 100 MCG tablet Take 1 tablet by mouth once daily 90 tablet 0   • montelukast (SINGULAIR) 10 MG tablet Take 1 tablet by mouth Every Night. 90 tablet 0   • oxybutynin XL (DITROPAN-XL) 5 MG 24 hr tablet Take 1 tablet by mouth Daily. 30 tablet 3   • phenazopyridine (PYRIDIUM) 100 MG tablet Take 1 tablet by mouth 3 (Three) Times a Day As Needed for bladder spasms. 6 tablet 0     No facility-administered medications prior to visit.        Patient Active Problem List   Diagnosis   • Hypothyroidism (acquired)   • Skin lesion of face   • Chronic right-sided low back pain with sciatica   • OAB (overactive bladder)   • Reactive depression       Advanced Care Planning:  ACP discussion was held with the patient during this visit. Patient has an advance directive (not in EMR), copy requested.    Review of Systems    Compared to one year ago, the patient feels her physical health is worse.  Due to COVID  Compared to one year ago, the patient feels her mental health is worse.  Due to COVID    Reviewed chart for potential of high risk medication in the  "elderly: yes  Reviewed chart for potential of harmful drug interactions in the elderly:yes    Objective         Vitals:    07/01/20 1311   BP: 120/70   Pulse: 76   Temp: 97.3 °F (36.3 °C)   TempSrc: Temporal   SpO2: 93%   Weight: 96 kg (211 lb 9.6 oz)   Height: 167.6 cm (65.98\")       Body mass index is 34.17 kg/m².  Discussed the patient's BMI with her. The BMI is above average; no BMI management plan is appropriate..    Physical Exam          Assessment/Plan   Medicare Risks and Personalized Health Plan  CMS Preventative Services Quick Reference  Advance Directive Discussion  Alcohol Misuse  Depression/Dysphoria  Fall Risk  Obesity/Overweight   Urinary Incontinence    The above risks/problems have been discussed with the patient.  Pertinent information has been shared with the patient in the After Visit Summary.  Follow up plans and orders are seen below in the Assessment/Plan Section.    Diagnoses and all orders for this visit:    1. Medicare annual wellness visit, subsequent (Primary)    2. Hypothyroidism (acquired)    3. OAB (overactive bladder)    4. Fatigue, unspecified type  -     CBC & Differential  -     TSH  -     Comprehensive Metabolic Panel    5. Reactive depression  -     CBC & Differential  -     TSH    6. Shortness of breath  -     montelukast (Singulair) 10 MG tablet; Take 1 tablet by mouth Every Night.  Dispense: 90 tablet; Refill: 1  -     CBC & Differential      Follow Up:  No follow-ups on file.     An After Visit Summary and PPPS were given to the patient.        As part of wellness and prevention, the following topics were discussed with the patient:   Nutrition   Physical activity/regular exercise     Healthy weight    Injury prevention-falls in particular    Substance misuse/abuse-drinking more than normal due to boredom-discussed s/s problematic drinking and health implications   Mental health-importance sleep, stress mgmt, lonliness   Immunization-UTD-she will check on Tdap  Stopped " mammos and colonoscopies.

## 2020-07-02 LAB
ALBUMIN SERPL-MCNC: 4.6 G/DL (ref 3.5–5.2)
ALBUMIN/GLOB SERPL: 1.9 G/DL
ALP SERPL-CCNC: 98 U/L (ref 39–117)
ALT SERPL-CCNC: 11 U/L (ref 1–33)
AST SERPL-CCNC: 12 U/L (ref 1–32)
BASOPHILS # BLD AUTO: 0.02 10*3/MM3 (ref 0–0.2)
BASOPHILS NFR BLD AUTO: 0.3 % (ref 0–1.5)
BILIRUB SERPL-MCNC: 0.2 MG/DL (ref 0.2–1.2)
BUN SERPL-MCNC: 14 MG/DL (ref 8–23)
BUN/CREAT SERPL: 14.1 (ref 7–25)
CALCIUM SERPL-MCNC: 9.5 MG/DL (ref 8.6–10.5)
CHLORIDE SERPL-SCNC: 103 MMOL/L (ref 98–107)
CO2 SERPL-SCNC: 30.8 MMOL/L (ref 22–29)
CREAT SERPL-MCNC: 0.99 MG/DL (ref 0.57–1)
EOSINOPHIL # BLD AUTO: 0.16 10*3/MM3 (ref 0–0.4)
EOSINOPHIL NFR BLD AUTO: 2.7 % (ref 0.3–6.2)
ERYTHROCYTE [DISTWIDTH] IN BLOOD BY AUTOMATED COUNT: 12.8 % (ref 12.3–15.4)
GLOBULIN SER CALC-MCNC: 2.4 GM/DL
GLUCOSE SERPL-MCNC: 87 MG/DL (ref 65–99)
HCT VFR BLD AUTO: 46.3 % (ref 34–46.6)
HGB BLD-MCNC: 15.5 G/DL (ref 12–15.9)
IMM GRANULOCYTES # BLD AUTO: 0.01 10*3/MM3 (ref 0–0.05)
IMM GRANULOCYTES NFR BLD AUTO: 0.2 % (ref 0–0.5)
LYMPHOCYTES # BLD AUTO: 1.76 10*3/MM3 (ref 0.7–3.1)
LYMPHOCYTES NFR BLD AUTO: 30.1 % (ref 19.6–45.3)
MCH RBC QN AUTO: 31.9 PG (ref 26.6–33)
MCHC RBC AUTO-ENTMCNC: 33.5 G/DL (ref 31.5–35.7)
MCV RBC AUTO: 95.3 FL (ref 79–97)
MONOCYTES # BLD AUTO: 0.84 10*3/MM3 (ref 0.1–0.9)
MONOCYTES NFR BLD AUTO: 14.4 % (ref 5–12)
NEUTROPHILS # BLD AUTO: 3.05 10*3/MM3 (ref 1.7–7)
NEUTROPHILS NFR BLD AUTO: 52.3 % (ref 42.7–76)
NRBC BLD AUTO-RTO: 0 /100 WBC (ref 0–0.2)
PLATELET # BLD AUTO: 209 10*3/MM3 (ref 140–450)
POTASSIUM SERPL-SCNC: 5 MMOL/L (ref 3.5–5.2)
PROT SERPL-MCNC: 7 G/DL (ref 6–8.5)
RBC # BLD AUTO: 4.86 10*6/MM3 (ref 3.77–5.28)
SODIUM SERPL-SCNC: 145 MMOL/L (ref 136–145)
TSH SERPL DL<=0.005 MIU/L-ACNC: 1.38 UIU/ML (ref 0.27–4.2)
WBC # BLD AUTO: 5.84 10*3/MM3 (ref 3.4–10.8)

## 2020-08-04 RX ORDER — LEVOTHYROXINE SODIUM 100 UG/1
TABLET ORAL
Qty: 90 TABLET | Refills: 1 | Status: SHIPPED | OUTPATIENT
Start: 2020-08-04 | End: 2020-11-20 | Stop reason: SDUPTHER

## 2020-09-01 ENCOUNTER — OFFICE VISIT (OUTPATIENT)
Dept: FAMILY MEDICINE CLINIC | Facility: CLINIC | Age: 70
End: 2020-09-01

## 2020-09-01 ENCOUNTER — TELEPHONE (OUTPATIENT)
Dept: FAMILY MEDICINE CLINIC | Facility: CLINIC | Age: 70
End: 2020-09-01

## 2020-09-01 VITALS
SYSTOLIC BLOOD PRESSURE: 146 MMHG | OXYGEN SATURATION: 90 % | DIASTOLIC BLOOD PRESSURE: 70 MMHG | WEIGHT: 203 LBS | TEMPERATURE: 96 F | HEART RATE: 107 BPM | HEIGHT: 66 IN | BODY MASS INDEX: 32.62 KG/M2

## 2020-09-01 DIAGNOSIS — R30.0 DYSURIA: Primary | ICD-10-CM

## 2020-09-01 DIAGNOSIS — N30.01 ACUTE CYSTITIS WITH HEMATURIA: ICD-10-CM

## 2020-09-01 LAB
BILIRUB BLD-MCNC: ABNORMAL MG/DL
CLARITY, POC: ABNORMAL
COLOR UR: ABNORMAL
GLUCOSE UR STRIP-MCNC: NEGATIVE MG/DL
KETONES UR QL: ABNORMAL
LEUKOCYTE EST, POC: ABNORMAL
NITRITE UR-MCNC: POSITIVE MG/ML
PH UR: 5.5 [PH] (ref 5–8)
PROT UR STRIP-MCNC: ABNORMAL MG/DL
RBC # UR STRIP: ABNORMAL /UL
SP GR UR: 1.03 (ref 1–1.03)
UROBILINOGEN UR QL: NORMAL

## 2020-09-01 PROCEDURE — 81003 URINALYSIS AUTO W/O SCOPE: CPT | Performed by: NURSE PRACTITIONER

## 2020-09-01 PROCEDURE — 99213 OFFICE O/P EST LOW 20 MIN: CPT | Performed by: NURSE PRACTITIONER

## 2020-09-01 RX ORDER — NITROFURANTOIN 25; 75 MG/1; MG/1
100 CAPSULE ORAL 2 TIMES DAILY
Qty: 14 CAPSULE | Refills: 0 | Status: SHIPPED | OUTPATIENT
Start: 2020-09-01 | End: 2020-11-24

## 2020-09-01 NOTE — TELEPHONE ENCOUNTER
PATIENT IS EXPERIENCING LOWER BACK PAIN. IT BURNS AND HURTS WHEN SHE URINATES. IT LOOKS LIKE THERE IS SOME BLOOD ON THE TISSUE WHEN SHE PEES. SHE IS ALSO HAVING URGENCY AND FREQUENCY WITH LITTLE VOID. YESTERDAY SHE FELT CHILLED AND COULDNB'T GET WARM.     SHE IS WANTING TO KNOW IF [facility] WILL SEND IN AN ANTIBIOTIC FOR A UTI.     PATIENT CALLBACK [phone]     PHARMACY CONFIRMED  [facility] [address] - [address] [facility] - [phone]  - [phone] FX

## 2020-09-01 NOTE — PROGRESS NOTES
"Nyasia Gonzalez is a 70 y.o. female.     Chief Complaint   Patient presents with   • Urinary Tract Infection      HPI patient is here for suspected UTI.  She reports she had dysuria that started on Saturday.  She has been taking the d-mannose and it did seem to help over the weekend.  She also has some leftover Pyridium and has taken that and seemed to help a little bit.  She has been drinking more water as well.  However she has now developed worsening symptoms and today just does not feel well.      Social History     Tobacco Use   • Smoking status: Current Some Day Smoker     Types: Cigarettes   • Smokeless tobacco: Never Used   Substance Use Topics   • Alcohol use: Yes     Alcohol/week: 1.0 standard drinks     Types: 1 Glasses of wine per week   • Drug use: No       The following portions of the patient's history were reviewed and updated as appropriate: allergies, current medications, past family history, past medical history, past social history, past surgical history and problem list.    Review of Systems   Constitutional: Positive for activity change, appetite change and chills. Negative for fever.   Respiratory: Negative for cough and shortness of breath.    Cardiovascular: Negative for chest pain and palpitations.   Gastrointestinal: Negative for abdominal pain, diarrhea, nausea and vomiting.   Genitourinary: Positive for dysuria, frequency, pelvic pain and urgency. Negative for difficulty urinating, flank pain and hematuria.   Musculoskeletal: Negative for arthralgias and myalgias.   Neurological: Negative for weakness and headaches.       Objective   Blood pressure 146/70, pulse 107, temperature 96 °F (35.6 °C), temperature source Temporal, height 167.6 cm (65.98\"), weight 92.1 kg (203 lb), SpO2 90 %, not currently breastfeeding.  Body mass index is 32.78 kg/m².    Physical Exam   Constitutional: She is oriented to person, place, and time. She appears well-developed and well-nourished. No " distress.   HENT:   Head: Normocephalic and atraumatic.   Eyes: Conjunctivae are normal. Right eye exhibits no discharge. Left eye exhibits no discharge.   Cardiovascular: Normal rate.   Pulmonary/Chest: Effort normal and breath sounds normal.   Abdominal: Soft. Bowel sounds are normal. There is no tenderness.   Musculoskeletal: She exhibits no deformity.   Gait smooth and steady   Neurological: She is alert and oriented to person, place, and time.   Skin: Skin is warm and dry. She is not diaphoretic.   Psychiatric: She has a normal mood and affect.   Nursing note and vitals reviewed.      Assessment   Problem List Items Addressed This Visit     None      Visit Diagnoses     Dysuria    -  Primary    Relevant Orders    POCT urinalysis dipstick, automated (Completed)    Urine Culture - Urine, Urine, Clean Catch    Acute cystitis with hematuria        Relevant Medications    nitrofurantoin, macrocrystal-monohydrate, (Macrobid) 100 MG capsule           Procedures           Impression and Plan: UA is consistent with UTI.  She has a penicillin allergy or I would consider giving her a cephalosporin injection since she is not feeling well today.  Some of this may be her underlying depression and loneliness due to COVID.  She also feeling sad about possibly having to give up her dog.    We will treat with Macrobid but send for culture.  I reviewed her last UTI from last year and it was sensitive to everything.    We discussed signs and symptoms that would require emergent treatment.  I want her to let me know tomorrow how she is doing.    Drink plenty water and I do recommend a couple doses of Pyridium today.      macrobid for UTI, she has pyridium and should use it.        Health Maintenance Due   Topic Date Due   • INFLUENZA VACCINE  08/01/2020              EMR Dragon/Transcription disclaimer:   Much of this encounter note is an electronic transcription/translation of spoken language to printed text. The electronic  translation of spoken language may permit erroneous, or at times, nonsensical words or phrases to be inadvertently transcribed; Although I have reviewed the note for such errors, some may still exist.

## 2020-09-04 LAB
BACTERIA UR CULT: ABNORMAL
BACTERIA UR CULT: ABNORMAL
OTHER ANTIBIOTIC SUSC ISLT: ABNORMAL

## 2020-09-08 RX ORDER — BUPROPION HYDROCHLORIDE 150 MG/1
150 TABLET ORAL EVERY MORNING
Qty: 90 TABLET | Refills: 3 | Status: SHIPPED | OUTPATIENT
Start: 2020-09-08 | End: 2021-09-01

## 2020-10-12 ENCOUNTER — FLU SHOT (OUTPATIENT)
Dept: FAMILY MEDICINE CLINIC | Facility: CLINIC | Age: 70
End: 2020-10-12

## 2020-10-12 DIAGNOSIS — Z23 NEED FOR IMMUNIZATION AGAINST INFLUENZA: Primary | ICD-10-CM

## 2020-10-12 PROCEDURE — G0008 ADMIN INFLUENZA VIRUS VAC: HCPCS | Performed by: NURSE PRACTITIONER

## 2020-10-12 PROCEDURE — 90694 VACC AIIV4 NO PRSRV 0.5ML IM: CPT | Performed by: NURSE PRACTITIONER

## 2020-11-23 RX ORDER — LEVOTHYROXINE SODIUM 0.1 MG/1
100 TABLET ORAL DAILY
Qty: 90 TABLET | Refills: 0 | Status: SHIPPED | OUTPATIENT
Start: 2020-11-23 | End: 2020-11-24 | Stop reason: SDUPTHER

## 2020-11-23 NOTE — TELEPHONE ENCOUNTER
Patient calling about the Rx, said has been trying to get refilled by [facility] for a week and is not completely out.    Callback# [phone]

## 2020-11-24 ENCOUNTER — OFFICE VISIT (OUTPATIENT)
Dept: FAMILY MEDICINE CLINIC | Facility: CLINIC | Age: 70
End: 2020-11-24

## 2020-11-24 VITALS
HEIGHT: 66 IN | TEMPERATURE: 98.2 F | OXYGEN SATURATION: 91 % | BODY MASS INDEX: 33.11 KG/M2 | SYSTOLIC BLOOD PRESSURE: 109 MMHG | RESPIRATION RATE: 16 BRPM | WEIGHT: 206 LBS | DIASTOLIC BLOOD PRESSURE: 71 MMHG | HEART RATE: 91 BPM

## 2020-11-24 DIAGNOSIS — N30.00 ACUTE CYSTITIS WITHOUT HEMATURIA: Primary | ICD-10-CM

## 2020-11-24 DIAGNOSIS — N39.0 URINARY TRACT INFECTION WITHOUT HEMATURIA, SITE UNSPECIFIED: ICD-10-CM

## 2020-11-24 DIAGNOSIS — E03.9 HYPOTHYROIDISM (ACQUIRED): ICD-10-CM

## 2020-11-24 LAB
BILIRUB BLD-MCNC: ABNORMAL MG/DL
CLARITY, POC: CLEAR
COLOR UR: ABNORMAL
GLUCOSE UR STRIP-MCNC: NEGATIVE MG/DL
KETONES UR QL: NEGATIVE
LEUKOCYTE EST, POC: ABNORMAL
NITRITE UR-MCNC: POSITIVE MG/ML
PH UR: 5 [PH] (ref 5–8)
PROT UR STRIP-MCNC: ABNORMAL MG/DL
RBC # UR STRIP: NEGATIVE /UL
SP GR UR: 1.03 (ref 1–1.03)
UROBILINOGEN UR QL: NORMAL

## 2020-11-24 PROCEDURE — 81003 URINALYSIS AUTO W/O SCOPE: CPT | Performed by: NURSE PRACTITIONER

## 2020-11-24 PROCEDURE — 99214 OFFICE O/P EST MOD 30 MIN: CPT | Performed by: NURSE PRACTITIONER

## 2020-11-24 RX ORDER — NITROFURANTOIN 25; 75 MG/1; MG/1
100 CAPSULE ORAL 2 TIMES DAILY
Qty: 14 CAPSULE | Refills: 1 | Status: SHIPPED | OUTPATIENT
Start: 2020-11-24 | End: 2021-01-08

## 2020-11-24 RX ORDER — LEVOTHYROXINE SODIUM 0.1 MG/1
100 TABLET ORAL DAILY
Qty: 90 TABLET | Refills: 1 | Status: SHIPPED | OUTPATIENT
Start: 2020-11-24 | End: 2021-08-18

## 2020-11-24 NOTE — PATIENT INSTRUCTIONS

## 2020-11-24 NOTE — PROGRESS NOTES
Subjective   Sharon Gonzalez is a 70 y.o. female.     Chief Complaint   Patient presents with   • Urinary Tract Infection     x1wk   • Bloated   • Back Pain     lower back towards the right side      HPI patient is here for about a week of lower urinary symptoms that were different than her normal.  She began taking some D mannose with a little bit of improvement.  She had some left anterior groin pain that radiated to her left back.  She developed dysuria, urgency, frequency in the last couple days.    She tried to also increase her water intake.    She still struggling some with the fatigue and isolation due to Covid.  She has been out of her levothyroxine for couple days while she is trying to transition her prescriptions to a different pharmacy.    Social History     Tobacco Use   • Smoking status: Current Some Day Smoker     Types: Cigarettes   • Smokeless tobacco: Never Used   Substance Use Topics   • Alcohol use: Yes     Alcohol/week: 1.0 standard drinks     Types: 1 Glasses of wine per week   • Drug use: No       The following portions of the patient's history were reviewed and updated as appropriate: allergies, current medications, past family history, past medical history, past social history, past surgical history and problem list.    Review of Systems   Constitutional: Positive for fatigue. Negative for activity change, appetite change, chills and fever.   Respiratory: Positive for cough. Negative for chest tightness, shortness of breath and wheezing.    Cardiovascular: Negative for chest pain and palpitations.   Gastrointestinal: Negative for abdominal pain, diarrhea, nausea and vomiting.   Genitourinary: Positive for dysuria, flank pain, frequency and urgency. Negative for difficulty urinating, hematuria, vaginal bleeding and vaginal discharge.   Neurological: Negative for weakness and headaches.       Objective   Blood pressure 109/71, pulse 91, temperature 98.2 °F (36.8 °C), resp. rate 16, height  "167.6 cm (66\"), weight 93.4 kg (206 lb), SpO2 91 %, not currently breastfeeding.  Body mass index is 33.25 kg/m².    Physical Exam  Vitals signs and nursing note reviewed.   Constitutional:       General: She is not in acute distress.     Appearance: She is well-developed. She is not diaphoretic.   HENT:      Head: Normocephalic and atraumatic.   Eyes:      General:         Right eye: No discharge.         Left eye: No discharge.      Conjunctiva/sclera: Conjunctivae normal.   Cardiovascular:      Rate and Rhythm: Normal rate and regular rhythm.      Heart sounds: Normal heart sounds.   Pulmonary:      Effort: Pulmonary effort is normal.      Breath sounds: Normal breath sounds.   Abdominal:      General: Bowel sounds are normal.      Palpations: Abdomen is soft.      Tenderness: There is no abdominal tenderness. There is no right CVA tenderness or left CVA tenderness.   Musculoskeletal:         General: No deformity.      Comments: Gait smooth and steady   Skin:     General: Skin is warm and dry.   Neurological:      Mental Status: She is alert and oriented to person, place, and time.         Assessment   Problem List Items Addressed This Visit        Endocrine    Hypothyroidism (acquired)    Relevant Medications    levothyroxine (Euthyrox) 100 MCG tablet      Other Visit Diagnoses     Acute cystitis without hematuria    -  Primary    Relevant Medications    nitrofurantoin, macrocrystal-monohydrate, (Macrobid) 100 MG capsule    Other Relevant Orders    Urine Culture - Urine, Urine, Clean Catch    POC Urinalysis Dipstick, Automated (Completed)    Urinary tract infection without hematuria, site unspecified               Procedures           Impression and Plan: She does have symptoms of a UTI which are similar to previous and her UA is consistent with UTI.  I discussed with her the health implications of waiting so long with her symptoms.  Understandably she wants to avoid healthcare and risk of Covid.  She has just " accessed my chart for the first time today.  And she will plan to use it.  I am going to start her on Macrobid which she tolerated well for her last UTI.  Her last UTI several months ago was sensitive to everything.  I will send for culture and switch antibiotics if needed for sensitivity.  She is allergic to penicillins and Keflex.  And she does not want to take Bactrim.    I am concerned about her left groin pain, flank pain.  I cannot find any areas of tenderness to palpation.  I think we should get some imaging since his pain is not like her normal UTIs.  She is refusing that currently.  She does not do any colonoscopies or other screenings.  She does not want to know if there is anything else wrong.  She will think about it at home and let me know if she changes her mind.    We discussed signs and symptoms of complications that would require emergent  treatment and/or follow-up care.    We discussed side effects of antibiotics.    I have given her a refill on the Macrobid so she does not wait so long to treat the UTI.  However I have instructed her to let me know if she requires the antibiotics so that I can keep track of her UTIs for further management.      There are no preventive care reminders to display for this patient.           EMR Dragon/Transcription disclaimer:   Much of this encounter note is an electronic transcription/translation of spoken language to printed text. The electronic translation of spoken language may permit erroneous, or at times, nonsensical words or phrases to be inadvertently transcribed; Although I have reviewed the note for such errors, some may still exist.

## 2020-11-26 LAB
BACTERIA UR CULT: ABNORMAL
BACTERIA UR CULT: ABNORMAL
OTHER ANTIBIOTIC SUSC ISLT: ABNORMAL

## 2021-01-08 ENCOUNTER — HOSPITAL ENCOUNTER (OUTPATIENT)
Dept: GENERAL RADIOLOGY | Facility: HOSPITAL | Age: 71
Discharge: HOME | End: 2021-01-08

## 2021-01-08 ENCOUNTER — LAB (OUTPATIENT)
Dept: LAB | Facility: HOSPITAL | Age: 71
End: 2021-01-08

## 2021-01-08 DIAGNOSIS — R10.9 LEFT FLANK PAIN: ICD-10-CM

## 2021-01-08 DIAGNOSIS — R10.9 LEFT FLANK PAIN: Primary | ICD-10-CM

## 2021-01-08 LAB
BACTERIA UR QL AUTO: ABNORMAL /HPF
BILIRUB UR QL STRIP: ABNORMAL
CLARITY UR: CLEAR
COLOR UR: YELLOW
GLUCOSE UR STRIP-MCNC: NEGATIVE MG/DL
HGB UR QL STRIP.AUTO: ABNORMAL
HYALINE CASTS UR QL AUTO: ABNORMAL /LPF
KETONES UR QL STRIP: ABNORMAL
LEUKOCYTE ESTERASE UR QL STRIP.AUTO: ABNORMAL
NITRITE UR QL STRIP: NEGATIVE
PH UR STRIP.AUTO: 5.5 [PH] (ref 5–8)
PROT UR QL STRIP: ABNORMAL
RBC # UR: ABNORMAL /HPF
REF LAB TEST METHOD: ABNORMAL
SP GR UR STRIP: >=1.03 (ref 1–1.03)
SQUAMOUS #/AREA URNS HPF: ABNORMAL /HPF
UROBILINOGEN UR QL STRIP: ABNORMAL
WBC UR QL AUTO: ABNORMAL /HPF

## 2021-01-08 PROCEDURE — 81001 URINALYSIS AUTO W/SCOPE: CPT

## 2021-01-08 PROCEDURE — 74018 RADEX ABDOMEN 1 VIEW: CPT

## 2021-01-08 RX ORDER — SULFAMETHOXAZOLE AND TRIMETHOPRIM 800; 160 MG/1; MG/1
1 TABLET ORAL 2 TIMES DAILY
Qty: 6 TABLET | Refills: 0 | Status: SHIPPED | OUTPATIENT
Start: 2021-01-08 | End: 2021-01-12 | Stop reason: ALTCHOICE

## 2021-01-11 ENCOUNTER — HOSPITAL ENCOUNTER (OUTPATIENT)
Dept: CT IMAGING | Facility: HOSPITAL | Age: 71
Discharge: HOME | End: 2021-01-11
Admitting: NURSE PRACTITIONER

## 2021-01-11 ENCOUNTER — OFFICE VISIT (OUTPATIENT)
Dept: FAMILY MEDICINE CLINIC | Facility: CLINIC | Age: 71
End: 2021-01-11

## 2021-01-11 VITALS
HEART RATE: 97 BPM | OXYGEN SATURATION: 93 % | TEMPERATURE: 96.6 F | SYSTOLIC BLOOD PRESSURE: 103 MMHG | DIASTOLIC BLOOD PRESSURE: 67 MMHG | WEIGHT: 205 LBS | HEIGHT: 66 IN | BODY MASS INDEX: 32.95 KG/M2

## 2021-01-11 DIAGNOSIS — R31.9 HEMATURIA, UNSPECIFIED TYPE: ICD-10-CM

## 2021-01-11 DIAGNOSIS — N12 PYELONEPHRITIS OF LEFT KIDNEY: ICD-10-CM

## 2021-01-11 DIAGNOSIS — R10.9 ACUTE LEFT FLANK PAIN: Primary | ICD-10-CM

## 2021-01-11 LAB
BILIRUB BLD-MCNC: ABNORMAL MG/DL
CLARITY, POC: ABNORMAL
COLOR UR: YELLOW
GLUCOSE UR STRIP-MCNC: NEGATIVE MG/DL
KETONES UR QL: NEGATIVE
LEUKOCYTE EST, POC: ABNORMAL
NITRITE UR-MCNC: NEGATIVE MG/ML
PH UR: 5 [PH] (ref 5–8)
PROT UR STRIP-MCNC: ABNORMAL MG/DL
RBC # UR STRIP: NEGATIVE /UL
SP GR UR: 1.03 (ref 1–1.03)
UROBILINOGEN UR QL: NORMAL

## 2021-01-11 PROCEDURE — 99214 OFFICE O/P EST MOD 30 MIN: CPT | Performed by: NURSE PRACTITIONER

## 2021-01-11 PROCEDURE — 81003 URINALYSIS AUTO W/O SCOPE: CPT | Performed by: NURSE PRACTITIONER

## 2021-01-11 PROCEDURE — 74176 CT ABD & PELVIS W/O CONTRAST: CPT

## 2021-01-11 RX ORDER — ASPIRIN 81 MG/1
81 TABLET ORAL DAILY
COMMUNITY
End: 2021-01-12

## 2021-01-11 RX ORDER — CIPROFLOXACIN 500 MG/1
500 TABLET, FILM COATED ORAL 2 TIMES DAILY
Qty: 14 TABLET | Refills: 0 | Status: SHIPPED | OUTPATIENT
Start: 2021-01-11 | End: 2021-06-23

## 2021-01-13 LAB
BACTERIA UR CULT: ABNORMAL
BACTERIA UR CULT: ABNORMAL
OTHER ANTIBIOTIC SUSC ISLT: ABNORMAL

## 2021-06-23 ENCOUNTER — OFFICE VISIT (OUTPATIENT)
Dept: FAMILY MEDICINE CLINIC | Facility: CLINIC | Age: 71
End: 2021-06-23

## 2021-06-23 VITALS
BODY MASS INDEX: 33.09 KG/M2 | SYSTOLIC BLOOD PRESSURE: 111 MMHG | OXYGEN SATURATION: 94 % | DIASTOLIC BLOOD PRESSURE: 73 MMHG | HEIGHT: 66 IN | TEMPERATURE: 97.5 F | HEART RATE: 84 BPM

## 2021-06-23 DIAGNOSIS — E03.9 HYPOTHYROIDISM (ACQUIRED): ICD-10-CM

## 2021-06-23 DIAGNOSIS — N30.01 ACUTE CYSTITIS WITH HEMATURIA: Primary | ICD-10-CM

## 2021-06-23 DIAGNOSIS — R06.2 WHEEZING: ICD-10-CM

## 2021-06-23 DIAGNOSIS — L98.9 SKIN LESION: ICD-10-CM

## 2021-06-23 LAB
BILIRUB BLD-MCNC: ABNORMAL MG/DL
CLARITY, POC: CLEAR
COLOR UR: ABNORMAL
GLUCOSE UR STRIP-MCNC: NEGATIVE MG/DL
KETONES UR QL: ABNORMAL
LEUKOCYTE EST, POC: NEGATIVE
NITRITE UR-MCNC: NEGATIVE MG/ML
PH UR: 5.5 [PH] (ref 5–8)
PROT UR STRIP-MCNC: ABNORMAL MG/DL
RBC # UR STRIP: NEGATIVE /UL
SP GR UR: 1.03 (ref 1–1.03)
UROBILINOGEN UR QL: NORMAL

## 2021-06-23 PROCEDURE — 81003 URINALYSIS AUTO W/O SCOPE: CPT | Performed by: NURSE PRACTITIONER

## 2021-06-23 PROCEDURE — 99214 OFFICE O/P EST MOD 30 MIN: CPT | Performed by: NURSE PRACTITIONER

## 2021-06-23 RX ORDER — CIPROFLOXACIN 500 MG/1
500 TABLET, FILM COATED ORAL 2 TIMES DAILY
Qty: 14 TABLET | Refills: 0 | Status: SHIPPED | OUTPATIENT
Start: 2021-06-23 | End: 2022-03-18

## 2021-06-23 NOTE — PROGRESS NOTES
"Chief Complaint  Urinary Tract Infection (c/o frequent urination, lower back pain)    Subjective          Sharon Gonzalez presents to [facility]  History of Present Illness here for suspected UTI since this past Friday. She dips her urine daily and only saw trace of leukocytes until yesterday when she noticed blood in her urine on dipstick.  Leukocytes began this past Friday. Has had frequency, suprapubic discomfort and low back discomfort that seems to be worsening.      Little bit of chilling yesterday, no fever. Denies n/v/d.    Has been cutting her grass recently and noticed that she feels more wheezy cutting her grass.  She does take daily Zyrtec.  She was on Singulair but stopped taking it due to side effects she was concerned about.  She has been using her albuterol little bit more frequently recently.  She has had good response in the past to Trelegy inhaler which I had given her as a sample as it is not insurance coverage for her.She has had a little more cough and dyspnea with exertion recently.  No chest pain, palpitations, orthopnea.     She is taking and tolerating her levothyroxine well.  She denies any signs or symptoms of hypo or hyperthyroidism.    She has noticed some skin lesions on both of her upper outer thighs and wanted to see if they need evaluation.  They have been noticeable for several months at least.    Objective   Vital Signs:   /73   Pulse 84   Temp 97.5 °F (36.4 °C)   Ht 167.6 cm (66\")   SpO2 94%   BMI 33.09 kg/m²     Physical Exam  Vitals and nursing note reviewed.   Constitutional:       General: She is not in acute distress.     Appearance: She is well-developed. She is not ill-appearing or diaphoretic.   HENT:      Head: Normocephalic and atraumatic.   Eyes:      General:         Right eye: No discharge.         Left eye: No discharge.      Conjunctiva/sclera: Conjunctivae normal.   Cardiovascular:      Rate and Rhythm: Normal rate and " regular rhythm.      Heart sounds: Normal heart sounds.   Pulmonary:      Effort: Pulmonary effort is normal.      Breath sounds: Wheezing present. No rhonchi.   Abdominal:      General: Bowel sounds are normal.      Palpations: Abdomen is soft.      Tenderness: There is abdominal tenderness (suprapubic). There is no right CVA tenderness or left CVA tenderness.   Musculoskeletal:         General: No deformity.      Comments: Gait smooth and steady   Skin:     General: Skin is warm and dry.      Comments: x2 keratotic skin lesions noted b/l outer, upper thighs, right lesion more suspicious for precancer.  She also has a lesion at hairline left temple region needing eval.   Neurological:      General: No focal deficit present.      Mental Status: She is alert and oriented to person, place, and time.   Psychiatric:         Mood and Affect: Mood normal.         Behavior: Behavior normal.        Result Review :                 Assessment and Plan    Diagnoses and all orders for this visit:    1. Acute cystitis with hematuria (Primary)  -     ciprofloxacin (Cipro) 500 MG tablet; Take 1 tablet by mouth 2 (Two) Times a Day.  Dispense: 14 tablet; Refill: 0  -     POCT urinalysis dipstick, automated  -     Urine Culture - Urine, Urine, Clean Catch    2. Wheezing  -     CBC & Differential  -     Comprehensive Metabolic Panel  -     Fluticasone-Umeclidin-Vilant (Trelegy Ellipta) 100-62.5-25 MCG/INH inhaler; Inhale 1 puff Daily.  Dispense: 1 each; Refill: 0    3. Skin lesion  -     Cancel: Ambulatory Referral to Dermatology  -     Ambulatory Referral to Dermatology    4. Hypothyroidism (acquired)  -     TSH    UA is not indicative of UTI however her symptoms are.  I will go ahead and start antibiotics.  She declines Macrobid which she has done well with in the past and is requesting Cipro which she is taken and tolerated well in the past.  She is concerned that she is developing Pyelonephritis which she has had previously.  We  reviewed the black box warning and she prefers to start the Cipro.  We will send for culture.  We will change if needed based on culture and sensitivity.  Adequate hydration discussed.  Reinforced signs and symptoms of complications that require follow-up or emergent evaluation.  She has not had a UTI in about 6 months so I am pleased with this.    Wheezing: She is an intermittent smoker but does have a long past history of smoking.  She has never been officially diagnosed with COPD but she does have albuterol inhaler and uses it appropriately.  She is wheezing today.  She has no increased sputum but does have more shortness of breath.  We will give her a Trelegy sample which she has tolerated well in the past.  Increase her albuterol inhaler use to every 4-6 hours for wheezing or frequent cough, dyspnea.  She will let me know if she is not improving as expected.  She has been outside more cutting grass which is probably triggering some of the wheezing and not taking Singulair anymore.  I have instructed her to double up on her zyrtec to twice daily if she is noticing more wheezing to see if this helps.  She has had her Covid vaccines.  No recent sick exposures.    Hypothyroidism: She has been taking and tolerating her euthyroid.  Will check a TSH while she is here today as she does not like to come to the office much for visits.    She is to keratotic skin lesions.  Will refer to Derm.  He does have family history of nonmelanoma skin cancers.  And has quite a bit of sun exposure in her past.        Follow Up   No follow-ups on file.  Patient was given instructions and counseling regarding her condition or for health maintenance advice. Please see specific information pulled into the AVS if appropriate.

## 2021-06-24 LAB
ALBUMIN SERPL-MCNC: 4.4 G/DL (ref 3.5–5.2)
ALBUMIN/GLOB SERPL: 2.3 G/DL
ALP SERPL-CCNC: 98 U/L (ref 39–117)
ALT SERPL-CCNC: 13 U/L (ref 1–33)
AST SERPL-CCNC: 12 U/L (ref 1–32)
BASOPHILS # BLD AUTO: 0.03 10*3/MM3 (ref 0–0.2)
BASOPHILS NFR BLD AUTO: 0.5 % (ref 0–1.5)
BILIRUB SERPL-MCNC: 0.2 MG/DL (ref 0–1.2)
BUN SERPL-MCNC: 11 MG/DL (ref 8–23)
BUN/CREAT SERPL: 10.6 (ref 7–25)
CALCIUM SERPL-MCNC: 9.6 MG/DL (ref 8.6–10.5)
CHLORIDE SERPL-SCNC: 105 MMOL/L (ref 98–107)
CO2 SERPL-SCNC: 29 MMOL/L (ref 22–29)
CREAT SERPL-MCNC: 1.04 MG/DL (ref 0.57–1)
EOSINOPHIL # BLD AUTO: 0.15 10*3/MM3 (ref 0–0.4)
EOSINOPHIL NFR BLD AUTO: 2.7 % (ref 0.3–6.2)
ERYTHROCYTE [DISTWIDTH] IN BLOOD BY AUTOMATED COUNT: 12.6 % (ref 12.3–15.4)
GLOBULIN SER CALC-MCNC: 1.9 GM/DL
GLUCOSE SERPL-MCNC: 107 MG/DL (ref 65–99)
HCT VFR BLD AUTO: 48.3 % (ref 34–46.6)
HGB BLD-MCNC: 15.6 G/DL (ref 12–15.9)
IMM GRANULOCYTES # BLD AUTO: 0.01 10*3/MM3 (ref 0–0.05)
IMM GRANULOCYTES NFR BLD AUTO: 0.2 % (ref 0–0.5)
LYMPHOCYTES # BLD AUTO: 1.74 10*3/MM3 (ref 0.7–3.1)
LYMPHOCYTES NFR BLD AUTO: 31.5 % (ref 19.6–45.3)
MCH RBC QN AUTO: 31.9 PG (ref 26.6–33)
MCHC RBC AUTO-ENTMCNC: 32.3 G/DL (ref 31.5–35.7)
MCV RBC AUTO: 98.8 FL (ref 79–97)
MONOCYTES # BLD AUTO: 0.71 10*3/MM3 (ref 0.1–0.9)
MONOCYTES NFR BLD AUTO: 12.8 % (ref 5–12)
NEUTROPHILS # BLD AUTO: 2.89 10*3/MM3 (ref 1.7–7)
NEUTROPHILS NFR BLD AUTO: 52.3 % (ref 42.7–76)
NRBC BLD AUTO-RTO: 0 /100 WBC (ref 0–0.2)
PLATELET # BLD AUTO: 195 10*3/MM3 (ref 140–450)
POTASSIUM SERPL-SCNC: 4.7 MMOL/L (ref 3.5–5.2)
PROT SERPL-MCNC: 6.3 G/DL (ref 6–8.5)
RBC # BLD AUTO: 4.89 10*6/MM3 (ref 3.77–5.28)
SODIUM SERPL-SCNC: 145 MMOL/L (ref 136–145)
TSH SERPL DL<=0.005 MIU/L-ACNC: 1.4 UIU/ML (ref 0.27–4.2)
WBC # BLD AUTO: 5.53 10*3/MM3 (ref 3.4–10.8)

## 2021-06-25 LAB
BACTERIA UR CULT: NORMAL
BACTERIA UR CULT: NORMAL

## 2021-08-18 DIAGNOSIS — E03.9 HYPOTHYROIDISM (ACQUIRED): ICD-10-CM

## 2021-08-18 RX ORDER — LEVOTHYROXINE SODIUM 0.1 MG/1
TABLET ORAL
Qty: 90 TABLET | Refills: 1 | Status: SHIPPED | OUTPATIENT
Start: 2021-08-18 | End: 2022-02-14

## 2021-09-01 RX ORDER — BUPROPION HYDROCHLORIDE 150 MG/1
TABLET ORAL
Qty: 90 TABLET | Refills: 1 | Status: SHIPPED | OUTPATIENT
Start: 2021-09-01 | End: 2022-02-28

## 2022-02-12 DIAGNOSIS — E03.9 HYPOTHYROIDISM (ACQUIRED): ICD-10-CM

## 2022-02-14 RX ORDER — LEVOTHYROXINE SODIUM 0.1 MG/1
TABLET ORAL
Qty: 90 TABLET | Refills: 1 | Status: SHIPPED | OUTPATIENT
Start: 2022-02-14 | End: 2022-03-21 | Stop reason: SDUPTHER

## 2022-02-14 NOTE — TELEPHONE ENCOUNTER
Rx Refill Note  Requested Prescriptions     Pending Prescriptions Disp Refills   • levothyroxine (SYNTHROID, LEVOTHROID) 100 MCG tablet [Pharmacy Med Name: LEVOTHYROXINE 100 MCG TABLET] 90 tablet 1     Sig: TAKE ONE TABLET BY MOUTH DAILY      Last office visit with prescribing clinician: 6/23/2021      Next office visit with prescribing clinician: Visit date not found            Barby Alonzo MA  02/14/22, 09:20 EST

## 2022-02-16 ENCOUNTER — TELEPHONE (OUTPATIENT)
Dept: FAMILY MEDICINE CLINIC | Facility: CLINIC | Age: 72
End: 2022-02-16

## 2022-02-16 DIAGNOSIS — R06.09 DYSPNEA ON EXERTION: ICD-10-CM

## 2022-02-16 RX ORDER — ALBUTEROL SULFATE 90 UG/1
2 AEROSOL, METERED RESPIRATORY (INHALATION) EVERY 4 HOURS PRN
Status: CANCELLED | OUTPATIENT
Start: 2022-02-16

## 2022-02-16 NOTE — TELEPHONE ENCOUNTER
Caller: Sharon Gonzalez    Relationship: Self    Best call back number: [phone]    Requested Prescriptions:   Requested Prescriptions     Pending Prescriptions Disp Refills   • albuterol sulfate HFA (ProAir HFA) 108 (90 Base) MCG/ACT inhaler       Sig: Inhale 2 puffs Every 4 (Four) Hours As Needed for Wheezing or Shortness of Air. May sub for insurance approved equivalent. provide spacer.        Pharmacy where request should be sent: [facility] [address] [facility] - [phone]  - [phone]     Additional details provided by patient: HAS A LITTLE LEFT AND ITS EXIPIRED  Does the patient have less than a 3 day supply:  [x] Yes  [] No    Sukhdev Eastman Rep   02/16/22 12:20 EST

## 2022-02-17 DIAGNOSIS — R06.09 DYSPNEA ON EXERTION: ICD-10-CM

## 2022-02-17 RX ORDER — ALBUTEROL SULFATE 90 UG/1
2 AEROSOL, METERED RESPIRATORY (INHALATION) EVERY 4 HOURS PRN
Qty: 18 G | Refills: 3 | Status: SHIPPED | OUTPATIENT
Start: 2022-02-17

## 2022-02-17 NOTE — TELEPHONE ENCOUNTER
Rx Refill Note  Requested Prescriptions     Pending Prescriptions Disp Refills   • albuterol sulfate HFA (ProAir HFA) 108 (90 Base) MCG/ACT inhaler       Sig: Inhale 2 puffs Every 4 (Four) Hours As Needed for Wheezing or Shortness of Air. May sub for insurance approved equivalent. provide spacer.      Last office visit with prescribing clinician: 6/23/2021      Next office visit with prescribing clinician: Visit date not found            Barby Alonzo MA  02/17/22, 15:49 EST

## 2022-02-28 RX ORDER — BUPROPION HYDROCHLORIDE 150 MG/1
TABLET ORAL
Qty: 90 TABLET | Refills: 0 | Status: SHIPPED | OUTPATIENT
Start: 2022-02-28 | End: 2022-07-15

## 2022-03-18 ENCOUNTER — OFFICE VISIT (OUTPATIENT)
Dept: FAMILY MEDICINE CLINIC | Facility: CLINIC | Age: 72
End: 2022-03-18

## 2022-03-18 VITALS
HEIGHT: 66 IN | HEART RATE: 56 BPM | TEMPERATURE: 97.1 F | OXYGEN SATURATION: 96 % | DIASTOLIC BLOOD PRESSURE: 55 MMHG | BODY MASS INDEX: 34.72 KG/M2 | WEIGHT: 216 LBS | SYSTOLIC BLOOD PRESSURE: 88 MMHG

## 2022-03-18 DIAGNOSIS — E03.9 HYPOTHYROIDISM (ACQUIRED): ICD-10-CM

## 2022-03-18 DIAGNOSIS — E78.2 MIXED HYPERLIPIDEMIA: ICD-10-CM

## 2022-03-18 DIAGNOSIS — F32.9 REACTIVE DEPRESSION: ICD-10-CM

## 2022-03-18 DIAGNOSIS — R73.09 ELEVATED GLUCOSE: ICD-10-CM

## 2022-03-18 DIAGNOSIS — R06.09 DYSPNEA ON EXERTION: ICD-10-CM

## 2022-03-18 DIAGNOSIS — J45.40 MODERATE PERSISTENT REACTIVE AIRWAY DISEASE WITHOUT COMPLICATION: ICD-10-CM

## 2022-03-18 DIAGNOSIS — R82.998 URINE LEUKOCYTES: ICD-10-CM

## 2022-03-18 DIAGNOSIS — Z00.00 ENCOUNTER FOR ANNUAL WELLNESS VISIT (AWV) IN MEDICARE PATIENT: Primary | ICD-10-CM

## 2022-03-18 LAB
BILIRUB BLD-MCNC: NEGATIVE MG/DL
CLARITY, POC: CLEAR
COLOR UR: YELLOW
EXPIRATION DATE: ABNORMAL
GLUCOSE UR STRIP-MCNC: NEGATIVE MG/DL
KETONES UR QL: NEGATIVE
LEUKOCYTE EST, POC: ABNORMAL
Lab: ABNORMAL
NITRITE UR-MCNC: NEGATIVE MG/ML
PH UR: 5.5 [PH] (ref 5–8)
PROT UR STRIP-MCNC: NEGATIVE MG/DL
RBC # UR STRIP: NEGATIVE /UL
SP GR UR: 1.03 (ref 1–1.03)
UROBILINOGEN UR QL: NORMAL

## 2022-03-18 PROCEDURE — 1160F RVW MEDS BY RX/DR IN RCRD: CPT | Performed by: NURSE PRACTITIONER

## 2022-03-18 PROCEDURE — 1170F FXNL STATUS ASSESSED: CPT | Performed by: NURSE PRACTITIONER

## 2022-03-18 PROCEDURE — G0439 PPPS, SUBSEQ VISIT: HCPCS | Performed by: NURSE PRACTITIONER

## 2022-03-18 PROCEDURE — 99214 OFFICE O/P EST MOD 30 MIN: CPT | Performed by: NURSE PRACTITIONER

## 2022-03-18 PROCEDURE — 81003 URINALYSIS AUTO W/O SCOPE: CPT | Performed by: NURSE PRACTITIONER

## 2022-03-18 NOTE — PROGRESS NOTES
The ABCs of the Annual Wellness Visit  Subsequent Medicare Wellness Visit    Chief Complaint   Patient presents with   • Spasms     C/o lower back spasms,       Subjective    History of Present Illness:  Sharon Gonzalez is a 71 y.o. female who presents for a Subsequent Medicare Wellness Visit.    The following portions of the patient's history were reviewed and   updated as appropriate: allergies, current medications, past family history, past medical history, past social history, past surgical history and problem list.    Compared to one year ago, the patient feels her physical   health is better.    Compared to one year ago, the patient feels her mental   health is better.    Recent Hospitalizations:  She was not admitted to the hospital during the last year.       Current Medical Providers:  Patient Care Team:  Gabrielle Rush APRN as PCP - General (Nurse Practitioner)    Outpatient Medications Prior to Visit   Medication Sig Dispense Refill   • albuterol (PROVENTIL) (2.5 MG/3ML) 0.083% nebulizer solution Take 2.5 mg by nebulization Every 4 (Four) Hours As Needed for Wheezing. 50 vial 12   • albuterol sulfate HFA (ProAir HFA) 108 (90 Base) MCG/ACT inhaler Inhale 2 puffs Every 4 (Four) Hours As Needed for Wheezing or Shortness of Air. May sub for insurance approved equivalent. provide spacer. 18 g 3   • B Complex Vitamins (B COMPLEX-B12) tablet Take  by mouth.     • buPROPion XL (WELLBUTRIN XL) 150 MG 24 hr tablet TAKE ONE TABLET BY MOUTH EVERY MORNING 90 tablet 0   • Cholecalciferol (VITAMIN D) 2000 UNITS tablet Take  by mouth.     • Fluticasone-Umeclidin-Vilant (Trelegy Ellipta) 100-62.5-25 MCG/INH inhaler Inhale 1 puff Daily. 1 each 0   • levothyroxine (SYNTHROID, LEVOTHROID) 100 MCG tablet TAKE ONE TABLET BY MOUTH DAILY 90 tablet 1   • ciprofloxacin (Cipro) 500 MG tablet Take 1 tablet by mouth 2 (Two) Times a Day. 14 tablet 0     No facility-administered medications prior to visit.       No opioid  "medication identified on active medication list. I have reviewed chart for other potential  high risk medication/s and harmful drug interactions in the elderly.          Aspirin is not on active medication list.  Aspirin use is not indicated based on review of current medical condition/s. Risk of harm outweighs potential benefits.  .    Patient Active Problem List   Diagnosis   • Hypothyroidism (acquired)   • Skin lesion of face   • Chronic right-sided low back pain with sciatica   • OAB (overactive bladder)   • Reactive depression     Advance Care Planning  Advance Directive is not on file.  ACP discussion was held with the patient during this visit. Patient has an advance directive (not in EMR), copy requested.    Review of Systems   Constitutional: Negative for fatigue.   HENT: Negative for congestion, ear pain, sore throat and trouble swallowing.    Eyes: Negative for pain and visual disturbance.   Respiratory: Positive for cough and shortness of breath. Negative for wheezing.    Cardiovascular: Negative for chest pain, palpitations and leg swelling.   Gastrointestinal: Negative for abdominal pain, blood in stool, constipation, diarrhea, nausea and vomiting.   Endocrine: Negative for cold intolerance and heat intolerance.   Genitourinary: Positive for urgency. Negative for difficulty urinating, dysuria, flank pain and hematuria.   Musculoskeletal: Negative for gait problem and myalgias.   Skin: Negative for rash and wound.   Neurological: Negative for weakness and light-headedness.   Psychiatric/Behavioral: Negative for dysphoric mood and sleep disturbance. The patient is not nervous/anxious.         Objective    Vitals:    03/18/22 1207   BP: (!) 88/55   Pulse: 56   Temp: 97.1 °F (36.2 °C)   SpO2: 96%   Weight: 98 kg (216 lb)   Height: 167.6 cm (66\")     BMI Readings from Last 1 Encounters:   03/18/22 34.86 kg/m²   BMI is above normal parameters. Recommendations include: exercise counseling, nutrition " counseling and pharmacological intervention    Does the patient have evidence of cognitive impairment? No    Physical Exam  Vitals and nursing note reviewed.   Constitutional:       General: She is not in acute distress.     Appearance: She is well-developed. She is not ill-appearing.   HENT:      Head: Normocephalic and atraumatic.      Right Ear: Tympanic membrane, ear canal and external ear normal.      Left Ear: Tympanic membrane, ear canal and external ear normal.      Mouth/Throat:      Mouth: Mucous membranes are moist.      Pharynx: Uvula midline. No posterior oropharyngeal erythema.   Eyes:      General: No scleral icterus.        Right eye: No discharge.         Left eye: No discharge.      Conjunctiva/sclera: Conjunctivae normal.      Pupils: Pupils are equal, round, and reactive to light.   Neck:      Thyroid: No thyromegaly.   Cardiovascular:      Rate and Rhythm: Normal rate and regular rhythm.      Heart sounds: Normal heart sounds. No murmur heard.  Pulmonary:      Effort: Pulmonary effort is normal.      Breath sounds: Rhonchi (scattered, clears with cough) present. No wheezing or rales.   Abdominal:      General: Bowel sounds are normal.      Palpations: Abdomen is soft.      Tenderness: There is no abdominal tenderness. There is no right CVA tenderness or left CVA tenderness.   Musculoskeletal:         General: No deformity.      Cervical back: Neck supple.      Comments: Gait smooth and steady   Lymphadenopathy:      Cervical: No cervical adenopathy.   Skin:     General: Skin is warm and dry.   Neurological:      General: No focal deficit present.      Mental Status: She is alert and oriented to person, place, and time.   Psychiatric:         Attention and Perception: Attention and perception normal.         Mood and Affect: Mood and affect normal.         Speech: Speech normal.         Behavior: Behavior normal. Behavior is cooperative.         Thought Content: Thought content normal.          Cognition and Memory: Cognition normal.      Comments: Very pleasant, conversant, excellent medical historian       Lab Results   Component Value Date    CHLPL 200 (H) 03/18/2022    TRIG 85 03/18/2022    HDL 74 03/18/2022     (H) 03/18/2022    VLDL 15 03/18/2022    HGBA1C 5.7 (H) 03/18/2022            HEALTH RISK ASSESSMENT    Smoking Status:  Social History     Tobacco Use   Smoking Status Current Some Day Smoker   • Types: Cigarettes   Smokeless Tobacco Never Used     Alcohol Consumption:  Social History     Substance and Sexual Activity   Alcohol Use Yes   • Alcohol/week: 1.0 standard drink   • Types: 1 Glasses of wine per week     Fall Risk Screen:    ALISON Fall Risk Assessment was completed, and patient is at LOW risk for falls.Assessment completed on:3/18/2022    Depression Screening:  PHQ-2/PHQ-9 Depression Screening 3/18/2022   Retired Total Score -   Little Interest or Pleasure in Doing Things 0-->not at all   Feeling Down, Depressed or Hopeless 0-->not at all   Trouble Falling or Staying Asleep, or Sleeping Too Much 3-->nearly every day   Feeling Tired or Having Little Energy 2-->more than half the days   Poor Appetite or Overeating 1-->several days   PHQ-9: Brief Depression Severity Measure Score 6       Health Habits and Functional and Cognitive Screening:  Functional & Cognitive Status 3/18/2022   Do you have difficulty preparing food and eating? No   Do you have difficulty bathing yourself, getting dressed or grooming yourself? No   Do you have difficulty using the toilet? No   Do you have difficulty moving around from place to place? No   Do you have trouble with steps or getting out of a bed or a chair? No   Current Diet Well Balanced Diet   Dental Exam Up to date   Eye Exam Not up to date   Exercise (times per week) 0 times per week   Current Exercises Include No Regular Exercise   Do you need help using the phone?  No   Are you deaf or do you have serious difficulty hearing?  No   Do you  need help with transportation? No   Do you need help shopping? No   Do you need help preparing meals?  No   Do you need help with housework?  No   Do you need help with laundry? No   Do you need help taking your medications? No   Do you need help managing money? No   Do you ever drive or ride in a car without wearing a seat belt? No   Have you felt unusual stress, anger or loneliness in the last month? Yes   Who do you live with? Alone   If you need help, do you have trouble finding someone available to you? No   Have you been bothered in the last four weeks by sexual problems? -   Do you have difficulty concentrating, remembering or making decisions? No       Age-appropriate Screening Schedule:  Refer to the list below for future screening recommendations based on patient's age, sex and/or medical conditions. Orders for these recommended tests are listed in the plan section. The patient has been provided with a written plan.    Health Maintenance   Topic Date Due   • TDAP/TD VACCINES (1 - Tdap) Never done   • MAMMOGRAM  07/01/2022   • LIPID PANEL  03/18/2023   • INFLUENZA VACCINE  Completed   • DXA SCAN  Discontinued   • ZOSTER VACCINE  Discontinued              Assessment/Plan   CMS Preventative Services Quick Reference  Risk Factors Identified During Encounter  Immunizations Discussed/Encouraged (specific Immunizations; Tdap and COVID19  Obesity/Overweight   Urinary Incontinence  The above risks/problems have been discussed with the patient.  Follow up actions/plans if indicated are seen below in the Assessment/Plan Section.  Pertinent information has been shared with the patient in the After Visit Summary.    Diagnoses and all orders for this visit:    1. Encounter for annual wellness visit (AWV) in Medicare patient (Primary)    2. Hypothyroidism (acquired)  -     TSH    3. Reactive depression    4. Dyspnea on exertion  -     Comprehensive Metabolic Panel  -     CBC & Differential    5. Moderate persistent  reactive airway disease without complication    6. Mixed hyperlipidemia  -     Lipid Panel With LDL / HDL Ratio    7. Elevated glucose  -     Hemoglobin A1c    8. Urine leukocytes  -     POCT urinalysis dipstick, automated  -     Urine Culture - Urine, Urine, Clean Catch       As part of wellness and prevention, the following topics were discussed with the patient:   Nutrition-diet high in fresh/fozen veges, lower in carbs, unsaturated fats, and higher in lean proteins, adequate hydration   Physical activity/regular exercise-150 mins per week of moderate activity that increases HR and is enjoyable to lower stress and improve CVD     Healthy weight-above goal BMI     Injury prevention-covid safety, back and joint health    Substance misuse/abuse-none   Dental health-recommend twice per year dental cleans and daily flossing to lower inflammation in body   Mental health-stress mgmt and sleep hygiene   Immunization-recommended vaccines discussed-needs Tdap at pharmacy  Declines c scope or mammo, dexascan    Hypothyroidism:  Needs TSH today, for now will continue current dose, adjust if needed.     Depression:  Seems to be controlled with current wellbutrin. Will continue.     Reactive airway disease:  Feels like Trelegy is very helpful-uses it prn.  No recent exacerbations, hospitalizations.  Continues albuterol prn. Continue current plan.     HLD:  Not on statin.  Diet discussed.  Recheck lipids.  If indicated will discuss starting statin.     Has had some elevated glucoses in past on metabolic profiles, will get A1C today.     Has been testing urine at home with some recent +leuks.  No sx currently.  Would like to recheck.  UA was similar in office.  Will send for cx. Adequate hydration.     BP low today.  Recommend monitoring at home, adequate hydration.  Let me know if orthostasis.     Follow Up:   No follow-ups on file.     An After Visit Summary and PPPS were made available to the patient.

## 2022-03-19 LAB
ALBUMIN SERPL-MCNC: 4.7 G/DL (ref 3.7–4.7)
ALBUMIN/GLOB SERPL: 2 {RATIO} (ref 1.2–2.2)
ALP SERPL-CCNC: 112 IU/L (ref 44–121)
ALT SERPL-CCNC: 13 IU/L (ref 0–32)
AST SERPL-CCNC: 17 IU/L (ref 0–40)
BASOPHILS # BLD AUTO: 0 X10E3/UL (ref 0–0.2)
BASOPHILS NFR BLD AUTO: 1 %
BILIRUB SERPL-MCNC: 0.3 MG/DL (ref 0–1.2)
BUN SERPL-MCNC: 17 MG/DL (ref 8–27)
BUN/CREAT SERPL: 14 (ref 12–28)
CALCIUM SERPL-MCNC: 9.7 MG/DL (ref 8.7–10.3)
CHLORIDE SERPL-SCNC: 102 MMOL/L (ref 96–106)
CHOLEST SERPL-MCNC: 200 MG/DL (ref 100–199)
CO2 SERPL-SCNC: 26 MMOL/L (ref 20–29)
CREAT SERPL-MCNC: 1.18 MG/DL (ref 0.57–1)
EGFRCR SERPLBLD CKD-EPI 2021: 49 ML/MIN/1.73
EOSINOPHIL # BLD AUTO: 0.1 X10E3/UL (ref 0–0.4)
EOSINOPHIL NFR BLD AUTO: 2 %
ERYTHROCYTE [DISTWIDTH] IN BLOOD BY AUTOMATED COUNT: 12.9 % (ref 11.7–15.4)
GLOBULIN SER CALC-MCNC: 2.3 G/DL (ref 1.5–4.5)
GLUCOSE SERPL-MCNC: 88 MG/DL (ref 65–99)
HBA1C MFR BLD: 5.7 % (ref 4.8–5.6)
HCT VFR BLD AUTO: 46.4 % (ref 34–46.6)
HDLC SERPL-MCNC: 74 MG/DL
HGB BLD-MCNC: 15.6 G/DL (ref 11.1–15.9)
IMM GRANULOCYTES # BLD AUTO: 0 X10E3/UL (ref 0–0.1)
IMM GRANULOCYTES NFR BLD AUTO: 0 %
LDLC SERPL CALC-MCNC: 111 MG/DL (ref 0–99)
LDLC/HDLC SERPL: 1.5 RATIO (ref 0–3.2)
LYMPHOCYTES # BLD AUTO: 1.7 X10E3/UL (ref 0.7–3.1)
LYMPHOCYTES NFR BLD AUTO: 32 %
MCH RBC QN AUTO: 31.6 PG (ref 26.6–33)
MCHC RBC AUTO-ENTMCNC: 33.6 G/DL (ref 31.5–35.7)
MCV RBC AUTO: 94 FL (ref 79–97)
MONOCYTES # BLD AUTO: 0.6 X10E3/UL (ref 0.1–0.9)
MONOCYTES NFR BLD AUTO: 12 %
NEUTROPHILS # BLD AUTO: 2.8 X10E3/UL (ref 1.4–7)
NEUTROPHILS NFR BLD AUTO: 53 %
PLATELET # BLD AUTO: 207 X10E3/UL (ref 150–450)
POTASSIUM SERPL-SCNC: 4.6 MMOL/L (ref 3.5–5.2)
PROT SERPL-MCNC: 7 G/DL (ref 6–8.5)
RBC # BLD AUTO: 4.94 X10E6/UL (ref 3.77–5.28)
SODIUM SERPL-SCNC: 145 MMOL/L (ref 134–144)
TRIGL SERPL-MCNC: 85 MG/DL (ref 0–149)
TSH SERPL DL<=0.005 MIU/L-ACNC: 0.66 UIU/ML (ref 0.45–4.5)
VLDLC SERPL CALC-MCNC: 15 MG/DL (ref 5–40)
WBC # BLD AUTO: 5.4 X10E3/UL (ref 3.4–10.8)

## 2022-03-21 DIAGNOSIS — E03.9 HYPOTHYROIDISM (ACQUIRED): ICD-10-CM

## 2022-03-21 RX ORDER — LEVOTHYROXINE SODIUM 0.1 MG/1
100 TABLET ORAL DAILY
Qty: 90 TABLET | Refills: 1 | Status: SHIPPED | OUTPATIENT
Start: 2022-03-21 | End: 2023-02-17

## 2022-03-23 LAB
BACTERIA UR CULT: ABNORMAL
BACTERIA UR CULT: ABNORMAL
OTHER ANTIBIOTIC SUSC ISLT: ABNORMAL

## 2022-07-14 NOTE — TELEPHONE ENCOUNTER
Rx Refill Note  Requested Prescriptions     Pending Prescriptions Disp Refills   • buPROPion XL (WELLBUTRIN XL) 150 MG 24 hr tablet [Pharmacy Med Name: buPROPion HCL  MG TABLET] 90 tablet 0     Sig: TAKE ONE TABLET BY MOUTH EVERY MORNING      Last office visit with prescribing clinician: 3/18/2022      Next office visit with prescribing clinician: Visit date not found            Barby Alonzo MA  07/14/22, 15:46 EDT

## 2022-07-15 RX ORDER — BUPROPION HYDROCHLORIDE 150 MG/1
TABLET ORAL
Qty: 90 TABLET | Refills: 1 | Status: SHIPPED | OUTPATIENT
Start: 2022-07-15 | End: 2023-01-09

## 2022-08-10 ENCOUNTER — OFFICE VISIT (OUTPATIENT)
Dept: FAMILY MEDICINE CLINIC | Facility: CLINIC | Age: 72
End: 2022-08-10

## 2022-08-10 VITALS
HEIGHT: 66 IN | SYSTOLIC BLOOD PRESSURE: 121 MMHG | HEART RATE: 70 BPM | WEIGHT: 215 LBS | BODY MASS INDEX: 34.55 KG/M2 | OXYGEN SATURATION: 95 % | TEMPERATURE: 96 F | DIASTOLIC BLOOD PRESSURE: 71 MMHG

## 2022-08-10 DIAGNOSIS — E66.09 CLASS 1 OBESITY DUE TO EXCESS CALORIES WITH SERIOUS COMORBIDITY AND BODY MASS INDEX (BMI) OF 34.0 TO 34.9 IN ADULT: Chronic | ICD-10-CM

## 2022-08-10 DIAGNOSIS — Z71.3 DIETARY COUNSELING: ICD-10-CM

## 2022-08-10 DIAGNOSIS — F32.9 REACTIVE DEPRESSION: ICD-10-CM

## 2022-08-10 DIAGNOSIS — J45.40 MODERATE PERSISTENT REACTIVE AIRWAY DISEASE WITHOUT COMPLICATION: ICD-10-CM

## 2022-08-10 DIAGNOSIS — R19.5 POSITIVE FIT (FECAL IMMUNOCHEMICAL TEST): Primary | ICD-10-CM

## 2022-08-10 DIAGNOSIS — R73.03 PREDIABETES: ICD-10-CM

## 2022-08-10 PROCEDURE — 99214 OFFICE O/P EST MOD 30 MIN: CPT | Performed by: NURSE PRACTITIONER

## 2022-08-10 NOTE — PROGRESS NOTES
Chief Complaint The patient is here for a follow-up on a positive fit test. She consents to ROSE.    Hypothyroidism (F/u thyroid) and Black or Bloody Stool (Pt had a [facility] physician sent her a fit test through the mail and had positive FIT test, pt states it could have been from her hemorrhoid )    Subjective        Sharon Gonzalez presents to [facility]  History of Present Illness     She notes that she received a fit test in the mail. The patient states that she thought the test came from [employer], [employer].    Fit test came back positive from [employer].    The patient states that she had diarrhea, and irritated hemorrhoids prior to completing the test. She explains that she had some blood on the toilet paper when she did the test. The patient states that the company called her and advised her to see her physician due to the positive fit test.    The patient assumed the tests were ordered by [facility].    She notes that she is trying to lose weight. . The patient would like to lose 20 pounds. She notes that she has tried intermittent dieting and the 1,2,3 program.  She reports that her weight is interfering with her breathing.     She is not able to get out and exercise due to her breathing issues.    She states that she tried her sister-in-laws Ozempic and it made her ill the following morning. The patient tried the 0.25 dose of Ozempic.  The patient has also tried Golo. She inquired about other options for losing weight. The patient states that she has tried Phentermine in the past and it was not beneficial.     The patient reports that she does not cook. She notes that the pharmacy did not double her dose of Wellbutrin as we discussed at last visit. The patient states that she tried taking the Wellbutrin for 1-2 days but then had concerns that she may run out of her medication.  With just couple doses she did not notice any side effects.    She inquired about a  "medication to assist her with wheezing. The patient states that the Trelegy works well, but she does not use it daily. She tries to save it because she wants to have it available for when she is wheezing.     She denies current wheezing, chest pain, cough, increased shortness of breath.  She has no abdominal symptoms.  Feels like mood is stable.  She has no signs or symptoms of hypo or hyperthyroidism or hypo or hyper glycemia.      Objective   Vital Signs:  /71   Pulse 70   Temp 96 °F (35.6 °C)   Ht 167.6 cm (66\")   Wt 97.5 kg (215 lb)   SpO2 95%   BMI 34.70 kg/m²   Estimated body mass index is 34.7 kg/m² as calculated from the following:    Height as of this encounter: 167.6 cm (66\").    Weight as of this encounter: 97.5 kg (215 lb).    BMI is >= 30 and <35. (Class 1 Obesity). The following options were offered after discussion;: weight loss educational material (shared in after visit summary)      Physical Exam  Vitals and nursing note reviewed.   Constitutional:       General: She is not in acute distress.     Appearance: She is well-developed. She is not ill-appearing or diaphoretic.   HENT:      Head: Normocephalic and atraumatic.   Eyes:      General:         Right eye: No discharge.         Left eye: No discharge.      Conjunctiva/sclera: Conjunctivae normal.   Cardiovascular:      Rate and Rhythm: Normal rate and regular rhythm.      Heart sounds: Normal heart sounds.   Pulmonary:      Effort: Pulmonary effort is normal.      Breath sounds: Normal breath sounds. No wheezing.   Abdominal:      General: Bowel sounds are normal.      Palpations: Abdomen is soft.      Tenderness: There is no abdominal tenderness.   Musculoskeletal:         General: No deformity.      Comments: Gait smooth and steady   Skin:     General: Skin is warm and dry.   Neurological:      General: No focal deficit present.      Mental Status: She is alert and oriented to person, place, and time.   Psychiatric:         Mood " and Affect: Mood normal.         Behavior: Behavior normal.         Thought Content: Thought content normal.      Comments: Very pleasant, conversant        Result Review :                Assessment and Plan    Wellbutrin  - She can try taking two Wellbutrin to see if that will assist her.   - She can take 300 mg in the morning, 2 pills, or take it the way it is written for weight loss, taking 1 in the morning and the second one no later than 2:00 in the afternoon. She will let me know how she is doing and I will send in a prescription.     Weight loss coverage  - She will check the insurance formulary to see what is covered for prediabetes.     Stool sample  - Will test stool for blood with hemoccult x 3 which she will return.  Positive fit test was likely due to bleeding hemorrhoids at the time of test.         Diagnoses and all orders for this visit:    1. Positive FIT (fecal immunochemical test) (Primary)    2. Reactive depression    3. Moderate persistent reactive airway disease without complication    4. Prediabetes    5. Class 1 obesity due to excess calories with serious comorbidity and body mass index (BMI) of 34.0 to 34.9 in adult  Comments:  comorbid prediabetes, Reactive airway    6. Dietary counseling    Patient had a positive fit test which was likely due to bleeding hemorrhoids at the time.  We will get 3 stools for Hemoccult to further evaluate.  Patient really does not want to have unnecessary colonoscopy.               Follow Up   No follow-ups on file.  Patient was given instructions and counseling regarding her condition or for health maintenance advice. Please see specific information pulled into the AVS if appropriate.     Transcribed from ambient dictation for SCHUYLER Baptiste by Betty Streeter.  08/10/22   16:00 EDT    Patient verbalized consent to the visit recording.  I have personally performed the services described in this document as transcribed by the above individual, and it  is both accurate and complete.  Betty Streeter  8/10/2022  16:00 EDT

## 2022-08-17 ENCOUNTER — CLINICAL SUPPORT (OUTPATIENT)
Dept: FAMILY MEDICINE CLINIC | Facility: CLINIC | Age: 72
End: 2022-08-17

## 2022-08-17 DIAGNOSIS — K92.1 BLOOD IN STOOL: Primary | ICD-10-CM

## 2022-08-18 ENCOUNTER — CLINICAL SUPPORT (OUTPATIENT)
Dept: FAMILY MEDICINE CLINIC | Facility: CLINIC | Age: 72
End: 2022-08-18

## 2022-08-18 DIAGNOSIS — R19.5 POSITIVE FIT (FECAL IMMUNOCHEMICAL TEST): Primary | ICD-10-CM

## 2022-08-18 LAB
EXPIRATION DATE 2: ABNORMAL
EXPIRATION DATE 3: ABNORMAL
EXPIRATION DATE: ABNORMAL
GASTROCULT GAST QL: NEGATIVE
HEMOCCULT SP2 STL QL: POSITIVE
HEMOCCULT SP3 STL QL: POSITIVE
Lab: ABNORMAL

## 2022-08-18 PROCEDURE — 82274 ASSAY TEST FOR BLOOD FECAL: CPT | Performed by: NURSE PRACTITIONER

## 2022-12-16 ENCOUNTER — OFFICE VISIT (OUTPATIENT)
Dept: FAMILY MEDICINE CLINIC | Facility: CLINIC | Age: 72
End: 2022-12-16

## 2022-12-16 VITALS
HEIGHT: 66 IN | TEMPERATURE: 97.8 F | OXYGEN SATURATION: 94 % | DIASTOLIC BLOOD PRESSURE: 77 MMHG | HEART RATE: 80 BPM | BODY MASS INDEX: 34.7 KG/M2 | SYSTOLIC BLOOD PRESSURE: 125 MMHG

## 2022-12-16 DIAGNOSIS — J44.1 COPD WITH EXACERBATION: Primary | ICD-10-CM

## 2022-12-16 PROCEDURE — 99214 OFFICE O/P EST MOD 30 MIN: CPT | Performed by: NURSE PRACTITIONER

## 2022-12-16 PROCEDURE — 96372 THER/PROPH/DIAG INJ SC/IM: CPT | Performed by: NURSE PRACTITIONER

## 2022-12-16 RX ORDER — AZITHROMYCIN 250 MG/1
TABLET, FILM COATED ORAL
Qty: 6 TABLET | Refills: 0 | Status: SHIPPED | OUTPATIENT
Start: 2022-12-16

## 2022-12-16 RX ORDER — PREDNISONE 20 MG/1
40 TABLET ORAL DAILY
Qty: 10 TABLET | Refills: 0 | Status: SHIPPED | OUTPATIENT
Start: 2022-12-16 | End: 2022-12-21

## 2022-12-16 RX ORDER — TRIAMCINOLONE ACETONIDE 40 MG/ML
40 INJECTION, SUSPENSION INTRA-ARTICULAR; INTRAMUSCULAR ONCE
Status: COMPLETED | OUTPATIENT
Start: 2022-12-16 | End: 2022-12-16

## 2022-12-16 RX ADMIN — TRIAMCINOLONE ACETONIDE 40 MG: 40 INJECTION, SUSPENSION INTRA-ARTICULAR; INTRAMUSCULAR at 15:16

## 2022-12-16 NOTE — PROGRESS NOTES
"Chief Complaint  Cough (C/o cough, sob, wheezing, nasal drainage, sinus pressure, no fever, since the end of Nov)    Subjective    {Problem List  Visit Diagnosis   Encounters  Notes  Medications  Labs  Result Review Imaging  Media :23}    Sharon Gonzalez presents to [facility]  History of Present Illness     Pt here for COPD exacerbation.  Has been sick with this for appr 2 weeks.  Has cough, chest congestion which has improved a little with mucinex.  She is short of breath.  Cough was a little more productive a day or two ago and now more dry.  She has used the Trelegy sample she has with some relief.  Has been using albuterol without long lasting improvement.  She did not think to use nebs, but will start.     The week of Thanksgiving she had UTI sx and went to .  Was treated with cipro, and pyridium.  Eventually this got better, but thinks it actually improved with comfort teas.  She has been taking probiotics.   No known sick contacts.  She has not had fever, chills that she is aware of.       Objective   Vital Signs:  /77   Pulse 80   Temp 97.8 °F (36.6 °C)   Ht 167.6 cm (66\")   SpO2 94%   BMI 34.70 kg/m²   Estimated body mass index is 34.7 kg/m² as calculated from the following:    Height as of this encounter: 167.6 cm (66\").    Weight as of 8/10/22: 97.5 kg (215 lb).    BMI is >= 30 and <35. (Class 1 Obesity). The following options were offered after discussion;: weight loss educational material (shared in after visit summary)      Physical Exam  Vitals and nursing note reviewed.   Constitutional:       General: She is not in acute distress.     Appearance: She is well-developed. She is ill-appearing. She is not diaphoretic.   HENT:      Head: Normocephalic and atraumatic.      Right Ear: Ear canal and external ear normal. A middle ear effusion is present.      Left Ear: Tympanic membrane, ear canal and external ear normal.      Mouth/Throat:      Mouth: " Mucous membranes are moist.      Pharynx: Oropharynx is clear.   Eyes:      General:         Right eye: No discharge.         Left eye: No discharge.      Conjunctiva/sclera: Conjunctivae normal.   Cardiovascular:      Rate and Rhythm: Normal rate and regular rhythm.      Heart sounds: Normal heart sounds.   Pulmonary:      Effort: Pulmonary effort is normal.      Breath sounds: No wheezing or rales.      Comments: Decreased b/l from mid lung to bases  Abdominal:      General: Bowel sounds are normal.      Palpations: Abdomen is soft.      Tenderness: There is no abdominal tenderness.   Musculoskeletal:         General: No deformity.      Comments: Gait smooth and steady   Skin:     General: Skin is warm and dry.   Neurological:      General: No focal deficit present.      Mental Status: She is alert and oriented to person, place, and time.   Psychiatric:         Mood and Affect: Mood normal.         Behavior: Behavior normal.      Comments: Very pleasant, excellent medical historian        Result Review :                Assessment and Plan   Diagnoses and all orders for this visit:    1. COPD with exacerbation (HCC) (Primary)  -     Fluticasone-Umeclidin-Vilant (Trelegy Ellipta) 100-62.5-25 MCG/ACT inhaler; Inhale 1 puff Daily.  Dispense: 1 each; Refill: 3  -     azithromycin (Zithromax Z-Tejinder) 250 MG tablet; Take 2 tablets the first day, then 1 tablet daily for 4 days.  Dispense: 6 tablet; Refill: 0  -     predniSONE (DELTASONE) 20 MG tablet; Take 2 tablets by mouth Daily for 5 days. Take with breakfast  Dispense: 10 tablet; Refill: 0  -     triamcinolone acetonide (KENALOG-40) injection 40 mg    Will give kenalog now and she will start steroids in am. Zithromax started.  Will send Trelegy to see if insurance coverage for it-it works very well for her.  We did not have samples today.  I did give her a Breo to start daily if she cannot get Trelegy and have given prn asmanex to try.  Instructions on both.  She will  start nebs today.  If not improving will RTC.  If worsening, ER,          Follow Up   Return if symptoms worsen or fail to improve.  Patient was given instructions and counseling regarding her condition or for health maintenance advice. Please see specific information pulled into the AVS if appropriate.

## 2023-01-09 RX ORDER — BUPROPION HYDROCHLORIDE 150 MG/1
TABLET ORAL
Qty: 90 TABLET | Refills: 1 | Status: SHIPPED | OUTPATIENT
Start: 2023-01-09

## 2023-01-09 NOTE — TELEPHONE ENCOUNTER
Rx Refill Note  Requested Prescriptions     Pending Prescriptions Disp Refills   • buPROPion XL (WELLBUTRIN XL) 150 MG 24 hr tablet [Pharmacy Med Name: buPROPion HCL  MG TABLET] 90 tablet 1     Sig: TAKE ONE TABLET BY MOUTH EVERY MORNING      Last office visit with prescribing clinician: 12/16/2022   Last telemedicine visit with prescribing clinician: Visit date not found   Next office visit with prescribing clinician: Visit date not found                         Would you like a call back once the refill request has been completed: [] Yes [] No    If the office needs to give you a call back, can they leave a voicemail: [] Yes [] No    Barby Alonzo MA  01/09/23, 12:42 EST

## 2023-02-16 DIAGNOSIS — E03.9 HYPOTHYROIDISM (ACQUIRED): ICD-10-CM

## 2023-02-17 RX ORDER — LEVOTHYROXINE SODIUM 0.1 MG/1
TABLET ORAL
Qty: 90 TABLET | Refills: 0 | Status: SHIPPED | OUTPATIENT
Start: 2023-02-17

## 2023-05-08 ENCOUNTER — TELEPHONE (OUTPATIENT)
Dept: FAMILY MEDICINE CLINIC | Facility: CLINIC | Age: 73
End: 2023-05-08

## 2023-05-08 NOTE — TELEPHONE ENCOUNTER
PATIENT CALLED FOR LAB APPOINTMENT FOR HER ANNUAL WELLNESS VISIT ON 6/23/23    PLEASE CALL [phone]    ATTEMPTED WARM TRANSFER

## 2023-05-18 DIAGNOSIS — E03.9 HYPOTHYROIDISM (ACQUIRED): ICD-10-CM

## 2023-05-18 RX ORDER — LEVOTHYROXINE SODIUM 0.1 MG/1
TABLET ORAL
Qty: 90 TABLET | Refills: 0 | Status: SHIPPED | OUTPATIENT
Start: 2023-05-18

## 2023-05-26 ENCOUNTER — TELEPHONE (OUTPATIENT)
Dept: FAMILY MEDICINE CLINIC | Facility: CLINIC | Age: 73
End: 2023-05-26
Payer: MEDICARE

## 2023-05-26 DIAGNOSIS — E78.2 MIXED HYPERLIPIDEMIA: ICD-10-CM

## 2023-05-26 DIAGNOSIS — R19.5 POSITIVE FIT (FECAL IMMUNOCHEMICAL TEST): ICD-10-CM

## 2023-05-26 DIAGNOSIS — R73.03 PREDIABETES: ICD-10-CM

## 2023-05-26 DIAGNOSIS — E03.9 HYPOTHYROIDISM (ACQUIRED): Primary | ICD-10-CM

## 2023-07-31 ENCOUNTER — OFFICE VISIT (OUTPATIENT)
Dept: FAMILY MEDICINE CLINIC | Facility: CLINIC | Age: 73
End: 2023-07-31
Payer: MEDICARE

## 2023-07-31 VITALS
HEART RATE: 78 BPM | TEMPERATURE: 97.5 F | WEIGHT: 213.7 LBS | OXYGEN SATURATION: 96 % | BODY MASS INDEX: 34.34 KG/M2 | DIASTOLIC BLOOD PRESSURE: 68 MMHG | SYSTOLIC BLOOD PRESSURE: 130 MMHG | HEIGHT: 66 IN

## 2023-07-31 DIAGNOSIS — E66.09 CLASS 1 OBESITY DUE TO EXCESS CALORIES WITH SERIOUS COMORBIDITY AND BODY MASS INDEX (BMI) OF 34.0 TO 34.9 IN ADULT: ICD-10-CM

## 2023-07-31 DIAGNOSIS — J44.9 CHRONIC OBSTRUCTIVE PULMONARY DISEASE, UNSPECIFIED COPD TYPE: ICD-10-CM

## 2023-07-31 DIAGNOSIS — R35.0 URINARY FREQUENCY: ICD-10-CM

## 2023-07-31 DIAGNOSIS — Z00.00 ENCOUNTER FOR ANNUAL WELLNESS VISIT (AWV) IN MEDICARE PATIENT: Primary | ICD-10-CM

## 2023-07-31 DIAGNOSIS — E03.9 HYPOTHYROIDISM (ACQUIRED): ICD-10-CM

## 2023-07-31 LAB
BILIRUB BLD-MCNC: NEGATIVE MG/DL
CLARITY, POC: CLEAR
COLOR UR: YELLOW
EXPIRATION DATE: NORMAL
GLUCOSE UR STRIP-MCNC: NEGATIVE MG/DL
KETONES UR QL: NEGATIVE
LEUKOCYTE EST, POC: NEGATIVE
Lab: NORMAL
NITRITE UR-MCNC: NEGATIVE MG/ML
PH UR: 5.5 [PH] (ref 5–8)
PROT UR STRIP-MCNC: NEGATIVE MG/DL
RBC # UR STRIP: NEGATIVE /UL
SP GR UR: 1.03 (ref 1–1.03)
UROBILINOGEN UR QL: NORMAL

## 2023-07-31 RX ORDER — ASPIRIN 81 MG/1
81 TABLET, CHEWABLE ORAL DAILY
COMMUNITY

## 2023-07-31 RX ORDER — LANOLIN ALCOHOL/MO/W.PET/CERES
1000 CREAM (GRAM) TOPICAL DAILY
COMMUNITY

## 2023-07-31 RX ORDER — PHENTERMINE HYDROCHLORIDE 30 MG/1
30 CAPSULE ORAL EVERY MORNING
Qty: 30 CAPSULE | Refills: 0 | Status: SHIPPED | OUTPATIENT
Start: 2023-07-31

## 2023-07-31 RX ORDER — PSEUDOEPHEDRINE HCL 30 MG
100 TABLET ORAL DAILY
COMMUNITY

## 2023-07-31 NOTE — PROGRESS NOTES
The ABCs of the Annual Wellness Visit  Welcome to Medicare Visit    Subjective     Sharon Gonzalez is a 73 y.o. female who presents for a  Welcome to Medicare Visit.    The following portions of the patient's history were reviewed and   updated as appropriate: allergies, current medications, past family history, past medical history, past social history, past surgical history, and problem list.     Compared to one year ago, the patient feels her physical   health is the same.    Compared to one year ago, the patient feels her mental   health is the same.    Recent Hospitalizations:  She was not admitted to the hospital during the last year.       Current Medical Providers:  Patient Care Team:  Gabrielle Rush APRN as PCP - General (Nurse Practitioner)    Outpatient Medications Prior to Visit   Medication Sig Dispense Refill    albuterol (PROVENTIL) (2.5 MG/3ML) 0.083% nebulizer solution Take 2.5 mg by nebulization Every 4 (Four) Hours As Needed for Wheezing. 50 vial 12    albuterol sulfate HFA (ProAir HFA) 108 (90 Base) MCG/ACT inhaler Inhale 2 puffs Every 4 (Four) Hours As Needed for Wheezing or Shortness of Air. May sub for insurance approved equivalent. provide spacer. 18 g 3    aspirin 81 MG chewable tablet Chew 1 tablet Daily.      B Complex Vitamins (B COMPLEX-B12) tablet Take  by mouth.      buPROPion XL (WELLBUTRIN XL) 150 MG 24 hr tablet TAKE ONE TABLET BY MOUTH EVERY MORNING 90 tablet 1    Cholecalciferol (VITAMIN D) 2000 UNITS tablet Take  by mouth.      docusate sodium 100 MG capsule Take 1 capsule by mouth Daily.      Fluticasone-Umeclidin-Vilant (Trelegy Ellipta) 100-62.5-25 MCG/ACT inhaler Inhale 1 puff Daily. 1 each 3    levothyroxine (SYNTHROID, LEVOTHROID) 100 MCG tablet TAKE ONE TABLET BY MOUTH DAILY 90 tablet 0    Unable to find 1 each 1 (One) Time. Med Name: vital proteins with collagen powder      vitamin B-12 (CYANOCOBALAMIN) 1000 MCG tablet Take 1 tablet by mouth Daily.      azithromycin  "(Zithromax Z-Tejinder) 250 MG tablet Take 2 tablets the first day, then 1 tablet daily for 4 days. (Patient not taking: Reported on 7/31/2023) 6 tablet 0     No facility-administered medications prior to visit.       No opioid medication identified on active medication list. I have reviewed chart for other potential  high risk medication/s and harmful drug interactions in the elderly.        Aspirin is on active medication list. Aspirin use is not indicated based on review of current medical condition/s. Risk of harm outweighs potential benefits. Patient instructed to discontinue this medication.  .      Patient Active Problem List   Diagnosis    Hypothyroidism (acquired)    Skin lesion of face    Chronic right-sided low back pain with sciatica    OAB (overactive bladder)    Reactive depression     Advance Care Planning   Advance Care Planning     Advance Directive is not on file.  ACP discussion was held with the patient during this visit. Patient has an advance directive (not in EMR), copy requested.       Objective   Vitals:    07/31/23 1314   BP: 130/68   Pulse: 78   Temp: 97.5 øF (36.4 øC)   TempSrc: Temporal   SpO2: 96%   Weight: 96.9 kg (213 lb 11.2 oz)   Height: 167.6 cm (66\")     Estimated body mass index is 34.49 kg/mý as calculated from the following:    Height as of this encounter: 167.6 cm (66\").    Weight as of this encounter: 96.9 kg (213 lb 11.2 oz).    BMI is >= 30 and <35. (Class 1 Obesity). The following options were offered after discussion;: weight loss educational material (shared in after visit summary)      Does the patient have evidence of cognitive impairment?   No    Lab Results   Component Value Date    CHLPL 217 (H) 07/24/2023    TRIG 93 07/24/2023    HDL 77 (H) 07/24/2023     (H) 07/24/2023    VLDL 16 07/24/2023    HGBA1C 5.70 (H) 07/24/2023       Procedures       HEALTH RISK ASSESSMENT    Smoking Status:  Social History     Tobacco Use   Smoking Status Some Days    Types: Cigarettes "   Smokeless Tobacco Never     Alcohol Consumption:  Social History     Substance and Sexual Activity   Alcohol Use Yes    Alcohol/week: 1.0 standard drink    Types: 1 Glasses of wine per week       Fall Risk Screen:    ALISON Fall Risk Assessment was completed, and patient is at LOW risk for falls.Assessment completed on:2023    Depression Screen:       2023     1:12 PM   PHQ-2/PHQ-9 Depression Screening   Little Interest or Pleasure in Doing Things 0-->not at all   Feeling Down, Depressed or Hopeless 1-->several days   PHQ-9: Brief Depression Severity Measure Score 1       Health Habits and Functional and Cognitive Screenin/31/2023     1:06 PM   Functional & Cognitive Status   Do you have difficulty preparing food and eating? Yes   Do you have difficulty bathing yourself, getting dressed or grooming yourself? No   Do you have difficulty using the toilet? No   Do you have difficulty moving around from place to place? No   Do you have trouble with steps or getting out of a bed or a chair? Yes   Current Diet Other   Dental Exam Up to date   Eye Exam Up to date   Exercise (times per week) 0 times per week   Current Exercises Include No Regular Exercise   Do you need help using the phone?  No   Are you deaf or do you have serious difficulty hearing?  No   Do you need help to go to places out of walking distance? No   Do you need help shopping? No   Do you need help preparing meals?  No   Do you need help with housework?  Yes   Do you need help with laundry? No   Do you need help taking your medications? No   Do you need help managing money? No   Do you ever drive or ride in a car without wearing a seat belt? No   Have you felt unusual stress, anger or loneliness in the last month? Yes   Who do you live with? Alone   If you need help, do you have trouble finding someone available to you? No   Have you been bothered in the last four weeks by sexual problems? No   Do you have difficulty concentrating,  remembering or making decisions? Yes       Visual Acuity:    No results found.    Age-appropriate Screening Schedule:  Refer to the list below for future screening recommendations based on patient's age, sex and/or medical conditions. Orders for these recommended tests are listed in the plan section. The patient has been provided with a written plan.    Health Maintenance   Topic Date Due    TDAP/TD VACCINES (1 - Tdap) Never done    COVID-19 Vaccine (4 - Moderna series) 01/01/2022    ANNUAL WELLNESS VISIT  03/18/2023    MAMMOGRAM  07/31/2024 (Originally 7/1/2022)    INFLUENZA VACCINE  10/01/2023    LIPID PANEL  07/24/2024    COLORECTAL CANCER SCREENING  07/01/2030    Pneumococcal Vaccine 65+  Completed    HEPATITIS C SCREENING  Addressed    DXA SCAN  Discontinued    ZOSTER VACCINE  Discontinued        CMS Preventative Services Quick Reference  Risk Factors Identified During Encounter    Immunizations Discussed/Encouraged: Tdap  The above risks/problems have been discussed with the patient.  Pertinent information has been shared with the patient in the After Visit Summary.    Follow Up:   Initial Medicare Visit in one year    An After Visit Summary and PPPS were made available to the patient.      Additional E&M Note during same encounter follows:  Patient has multiple medical problems which are significant and separately identifiable that require additional work above and beyond the Medicare Wellness Visit.      Chief Complaint  Medicare Wellness-subsequent    Subjective        HPI  Sharon Gonzalez is also being seen today for weight management, hypothyroid, COPD, mild dysuria.      She is very frustrated with her weight.  Cannot be active due to both COPD and weight, but she thinks she could be more active if she could lose some weight.  She admits she eats more carbs than she should and is not getting out much which gives her more access to food.  She could not tolerate Ozempic at all due to terrible diarrhea and  "does not want to retry.  She is interested in phentermine to see if that would help suppress appetite.      COPD: Using inhalers as directed-no recent exacerbations.      Taking and tolerating levothyroxine without side effects.      She has had urgency, frequency-admits she does not drink enough water.      Review of Systems   Constitutional:  Negative for chills and fever.   HENT:  Negative for hearing loss and trouble swallowing.    Respiratory:  Negative for cough and wheezing.    Cardiovascular:  Negative for chest pain and palpitations.   Gastrointestinal:  Negative for abdominal pain and blood in stool.   Genitourinary:  Negative for difficulty urinating and hematuria.   Neurological:  Negative for dizziness and weakness.   Psychiatric/Behavioral:  Negative for dysphoric mood and sleep disturbance. The patient is not nervous/anxious.      Objective   Vital Signs:  /68   Pulse 78   Temp 97.5 øF (36.4 øC) (Temporal)   Ht 167.6 cm (66\")   Wt 96.9 kg (213 lb 11.2 oz)   SpO2 96%   BMI 34.49 kg/mý     Physical Exam  Vitals and nursing note reviewed.   Constitutional:       General: She is not in acute distress.     Appearance: She is well-developed. She is not ill-appearing.   HENT:      Head: Normocephalic and atraumatic.      Right Ear: Tympanic membrane, ear canal and external ear normal.      Left Ear: Tympanic membrane, ear canal and external ear normal.      Mouth/Throat:      Mouth: Mucous membranes are moist.      Pharynx: Oropharynx is clear. Uvula midline. No posterior oropharyngeal erythema.   Eyes:      General: No scleral icterus.        Right eye: No discharge.         Left eye: No discharge.      Conjunctiva/sclera: Conjunctivae normal.   Neck:      Thyroid: No thyromegaly.   Cardiovascular:      Rate and Rhythm: Normal rate and regular rhythm.      Heart sounds: Normal heart sounds. No murmur heard.  Pulmonary:      Effort: Pulmonary effort is normal.      Breath sounds: Normal breath " sounds.   Abdominal:      General: Bowel sounds are normal.      Palpations: Abdomen is soft.      Tenderness: There is no abdominal tenderness.   Musculoskeletal:         General: No deformity.      Cervical back: Neck supple.      Comments: Gait smooth and steady   Lymphadenopathy:      Cervical: No cervical adenopathy.   Skin:     General: Skin is warm and dry.   Neurological:      General: No focal deficit present.      Mental Status: She is alert and oriented to person, place, and time.   Psychiatric:         Mood and Affect: Mood normal.         Behavior: Behavior normal.         Thought Content: Thought content normal.      Comments: Very pleasant, conversant, excellent medical historian                    Assessment and Plan   Diagnoses and all orders for this visit:    1. Encounter for annual wellness visit (AWV) in Medicare patient (Primary)    2. Class 1 obesity due to excess calories with serious comorbidity and body mass index (BMI) of 34.0 to 34.9 in adult  -     phentermine 30 MG capsule; Take 1 capsule by mouth Every Morning.  Dispense: 30 capsule; Refill: 0  -     POCT urinalysis dipstick, automated    3. Urinary frequency    4. Hypothyroidism (acquired)    5. Chronic obstructive pulmonary disease, unspecified COPD type      Appropriate health maintenance and prevention topics specific for this patient were discussed today.  Additionally, health goals, and health concerns addressed as appropriate.  Pt was encouraged to stay up to date on recommended screenings and vaccines based on USPSTF guidelines.     We discussed MAT for weight mgmt.  She is interested in phentermine.  CSA done.  Not able to get UDS due to poc urine-will get at next visit.  Alejandro reviewed and appropriate.  Side effects, cautions discussed. It would benefit her overall health and anastasiia COPD for her to be able to lose some weight so that she could be more active and improve CVD health.     Will check TSH today.      COPD:  stable  without recent exacerbations.  Continue current.  No refills needed today.      UA looks OK other than it is concentrated which was discussed with pt.  She will work on staying better hydrated.      We had discussion about health screenings.  Those that pt does not want to do have been taken off HM.  Pt has good understanding of screenings and is able to clear decisions regarding her health.          Follow Up   No follow-ups on file.  Patient was given instructions and counseling regarding her condition or for health maintenance advice. Please see specific information pulled into the AVS if appropriate.

## 2023-08-22 DIAGNOSIS — E03.9 HYPOTHYROIDISM (ACQUIRED): ICD-10-CM

## 2023-08-22 RX ORDER — LEVOTHYROXINE SODIUM 0.1 MG/1
100 TABLET ORAL DAILY
Qty: 90 TABLET | Refills: 1 | Status: SHIPPED | OUTPATIENT
Start: 2023-08-22

## 2023-08-22 NOTE — TELEPHONE ENCOUNTER
Rx Refill Note  Requested Prescriptions     Pending Prescriptions Disp Refills    levothyroxine (SYNTHROID, LEVOTHROID) 100 MCG tablet 90 tablet 1     Sig: Take 1 tablet by mouth Daily.      Last office visit with prescribing clinician: 7/31/2023   Last telemedicine visit with prescribing clinician: Visit date not found   Next office visit with prescribing clinician: 9/5/2023       {TIP  Please add Last Relevant Lab Date if appropriate: 07/31/23                 Would you like a call back once the refill request has been completed: [] Yes [] No    If the office needs to give you a call back, can they leave a voicemail: [] Yes [] No    Beatris Craig MA  08/22/23, 10:50 EDT

## 2023-09-05 ENCOUNTER — OFFICE VISIT (OUTPATIENT)
Dept: FAMILY MEDICINE CLINIC | Facility: CLINIC | Age: 73
End: 2023-09-05
Payer: MEDICARE

## 2023-09-05 VITALS
OXYGEN SATURATION: 97 % | DIASTOLIC BLOOD PRESSURE: 72 MMHG | HEART RATE: 75 BPM | WEIGHT: 211.4 LBS | TEMPERATURE: 97.5 F | HEIGHT: 66 IN | SYSTOLIC BLOOD PRESSURE: 107 MMHG | BODY MASS INDEX: 33.97 KG/M2

## 2023-09-05 DIAGNOSIS — Z76.89 ENCOUNTER FOR WEIGHT MANAGEMENT: Primary | ICD-10-CM

## 2023-09-05 DIAGNOSIS — E66.09 CLASS 1 OBESITY DUE TO EXCESS CALORIES WITH SERIOUS COMORBIDITY AND BODY MASS INDEX (BMI) OF 34.0 TO 34.9 IN ADULT: ICD-10-CM

## 2023-09-05 NOTE — PROGRESS NOTES
"Chief Complaint  Weight Check (Pt is not taking the phentermine as prescribed. She is taking when she remembers )    Subjective        Sharon Gonzalez presents to [facility]  History of Present Illness  pt here for 1 month check on weight mgmt since starting phentermine.      Week delayed in starting as pharmacy out.     Has been taking 3-4 days per week-due to forgetting.  She does feel some appetite suppression for day after taking.  On home scale this am she was 203.4 and was 211 when started taking.  She denies side effects.  Typically when she checks BP at home on wrist cuff it is 114-126/60s. Mild constipation which is her norm.  Resolves with extra water and fiber. She has switched to sugar free popsicles at night.      Breathing has been pretty good-has had more days in August that she has had to use her Trelegy which she uses prn.     Denies HA, abd pain, chest pain, cough.      Objective   Vital Signs:  /72   Pulse 75   Temp 97.5 °F (36.4 °C) (Temporal)   Ht 167.6 cm (66\")   Wt 95.9 kg (211 lb 6.4 oz)   SpO2 97%   BMI 34.12 kg/m²   Estimated body mass index is 34.12 kg/m² as calculated from the following:    Height as of this encounter: 167.6 cm (66\").    Weight as of this encounter: 95.9 kg (211 lb 6.4 oz).               Physical Exam  Vitals and nursing note reviewed.   Constitutional:       General: She is not in acute distress.     Appearance: She is well-developed. She is obese. She is not ill-appearing or diaphoretic.   HENT:      Head: Normocephalic and atraumatic.   Eyes:      General:         Right eye: No discharge.         Left eye: No discharge.      Conjunctiva/sclera: Conjunctivae normal.   Cardiovascular:      Rate and Rhythm: Normal rate and regular rhythm.   Pulmonary:      Effort: Pulmonary effort is normal.      Breath sounds: Normal breath sounds. No wheezing or rales.   Abdominal:      General: Bowel sounds are normal.      Palpations: " Abdomen is soft.   Musculoskeletal:         General: No deformity.      Comments: Gait smooth and steady   Skin:     General: Skin is warm and dry.   Neurological:      General: No focal deficit present.      Mental Status: She is alert and oriented to person, place, and time.   Psychiatric:         Mood and Affect: Mood normal.         Behavior: Behavior normal.      Result Review :                   Assessment and Plan   Diagnoses and all orders for this visit:    1. Encounter for weight management (Primary)    2. Class 1 obesity due to excess calories with serious comorbidity and body mass index (BMI) of 34.0 to 34.9 in adult    She has lost small amount of weight on phentermine and no side effects.  I will continue. No refills needed today. She will send me msg via [employer] when she is ready for refill. Diet mods discussed.      COPD stable.  Recommend flu and covid when available.              Follow Up   Return in about 1 month (around 10/5/2023) for Recheck.  Patient was given instructions and counseling regarding her condition or for health maintenance advice. Please see specific information pulled into the AVS if appropriate.

## 2023-10-31 ENCOUNTER — TELEPHONE (OUTPATIENT)
Dept: FAMILY MEDICINE CLINIC | Facility: CLINIC | Age: 73
End: 2023-10-31

## 2023-10-31 NOTE — TELEPHONE ENCOUNTER
Caller: Sharon Gonzalez    Relationship: Self    Best call back number: [phone]    What was the call regarding: PATIENT WOULD LIKE TO KNOW IF JUAN YANES RECOMMENDS HER GETTING THE RSV SHOT.    PLEASE CALL TO ADVISE.

## 2023-12-14 DIAGNOSIS — R06.09 DYSPNEA ON EXERTION: ICD-10-CM

## 2023-12-14 RX ORDER — ALBUTEROL SULFATE 90 UG/1
2 AEROSOL, METERED RESPIRATORY (INHALATION) EVERY 4 HOURS PRN
Qty: 18 G | Refills: 3 | Status: SHIPPED | OUTPATIENT
Start: 2023-12-14

## 2023-12-14 NOTE — TELEPHONE ENCOUNTER
Caller: Sharon Gonzalez    Relationship: Self    Best call back number: [phone]     Requested Prescriptions:   Requested Prescriptions     Pending Prescriptions Disp Refills    albuterol sulfate HFA (ProAir HFA) 108 (90 Base) MCG/ACT inhaler 18 g 3     Sig: Inhale 2 puffs Every 4 (Four) Hours As Needed for Wheezing or Shortness of Air. May sub for insurance approved equivalent. provide spacer.        Pharmacy where request should be sent: [id number] [place], [place] - [address] - [phone] PH - [phone] FX     Last office visit with prescribing clinician: 9/5/2023   Last telemedicine visit with prescribing clinician: Visit date not found   Next office visit with prescribing clinician: Visit date not found     Does the patient have less than a 3 day supply:  [x] Yes  [] No    Would you like a call back once the refill request has been completed: [] Yes [x] No    If the office needs to give you a call back, can they leave a voicemail: [] Yes [x] No    Sukhdev Livingston Rep   12/14/23 14:56 EST

## 2024-01-05 RX ORDER — BUPROPION HYDROCHLORIDE 150 MG/1
TABLET ORAL
Qty: 90 TABLET | Refills: 1 | Status: SHIPPED | OUTPATIENT
Start: 2024-01-05

## 2024-02-15 DIAGNOSIS — E03.9 HYPOTHYROIDISM (ACQUIRED): ICD-10-CM

## 2024-02-15 RX ORDER — LEVOTHYROXINE SODIUM 0.1 MG/1
100 TABLET ORAL DAILY
Qty: 90 TABLET | Refills: 1 | Status: SHIPPED | OUTPATIENT
Start: 2024-02-15

## 2024-03-22 ENCOUNTER — TELEPHONE (OUTPATIENT)
Dept: FAMILY MEDICINE CLINIC | Facility: CLINIC | Age: 74
End: 2024-03-22
Payer: MEDICARE

## 2024-06-11 ENCOUNTER — OFFICE VISIT (OUTPATIENT)
Dept: FAMILY MEDICINE CLINIC | Facility: CLINIC | Age: 74
End: 2024-06-11
Payer: MEDICARE

## 2024-06-11 VITALS
HEART RATE: 80 BPM | DIASTOLIC BLOOD PRESSURE: 60 MMHG | RESPIRATION RATE: 14 BRPM | WEIGHT: 216.4 LBS | HEIGHT: 66 IN | OXYGEN SATURATION: 96 % | BODY MASS INDEX: 34.78 KG/M2 | SYSTOLIC BLOOD PRESSURE: 120 MMHG | TEMPERATURE: 97.4 F

## 2024-06-11 DIAGNOSIS — J44.1 COPD WITH EXACERBATION: ICD-10-CM

## 2024-06-11 DIAGNOSIS — R30.0 BURNING WITH URINATION: Primary | ICD-10-CM

## 2024-06-11 LAB
BILIRUB BLD-MCNC: ABNORMAL MG/DL
CLARITY, POC: ABNORMAL
COLOR UR: ABNORMAL
EXPIRATION DATE: ABNORMAL
GLUCOSE UR STRIP-MCNC: NEGATIVE MG/DL
KETONES UR QL: ABNORMAL
LEUKOCYTE EST, POC: ABNORMAL
Lab: ABNORMAL
NITRITE UR-MCNC: NEGATIVE MG/ML
PH UR: 5.5 [PH] (ref 5–8)
PROT UR STRIP-MCNC: ABNORMAL MG/DL
RBC # UR STRIP: NEGATIVE /UL
SP GR UR: 1.03 (ref 1–1.03)
UROBILINOGEN UR QL: NORMAL

## 2024-06-11 PROCEDURE — 1159F MED LIST DOCD IN RCRD: CPT | Performed by: NURSE PRACTITIONER

## 2024-06-11 PROCEDURE — 81003 URINALYSIS AUTO W/O SCOPE: CPT | Performed by: NURSE PRACTITIONER

## 2024-06-11 PROCEDURE — 1126F AMNT PAIN NOTED NONE PRSNT: CPT | Performed by: NURSE PRACTITIONER

## 2024-06-11 PROCEDURE — 1160F RVW MEDS BY RX/DR IN RCRD: CPT | Performed by: NURSE PRACTITIONER

## 2024-06-11 PROCEDURE — 99213 OFFICE O/P EST LOW 20 MIN: CPT | Performed by: NURSE PRACTITIONER

## 2024-06-11 RX ORDER — NITROFURANTOIN 25; 75 MG/1; MG/1
100 CAPSULE ORAL 2 TIMES DAILY
Qty: 14 CAPSULE | Refills: 0 | Status: SHIPPED | OUTPATIENT
Start: 2024-06-11

## 2024-06-11 NOTE — PROGRESS NOTES
Chief Complaint  Abdominal Pain, Dysuria (Pt states burning when urinating ), and Back Pain (Pt states lower back pain)    Nyasia Gonzalez presents to [facility]  History of Present Illness    History of Present Illness  The patient is a 73-year-old female who presents for evaluation of multiple medical concerns.    The patient suspects a uterine or bladder prolapse, as she perceives a sensation of prolapse during prolonged periods of sitting. She reports difficulty with urination, characterized by urinary urgency and incontinence, necessitating the use of a pad. Her urinary stream is irregular, with only achieving a solid stream upon waking in the morning. Is walking for exercise, she has experienced incontinence with and without exercise. She suspects a similar condition of pyelonephritis, characterized by pain that radiates into her hip and mid-lower back. Her recent urinalysis showed no bacteria, but trace leukocytes were detected. Her home dipstick test was positive for leukocytes. She denies having a fever but reports chills this morning. She has been experiencing intermittent nausea for the past few weeks, typically postprandially or when she is hungry. She is not currently taking phentermine or B12, but takes a vitamin B complex daily. Her medication regimen remains unchanged.   She reports minimal coughing and is responding well to Trelegy. She requests a refill of her Trelegy prescription, which she uses as needed due to cost.     She occasionally experiences pelvic pressure, but is unable to palpate the area, denies feeling vaginal fullness. She does not have a gynecologist. Her fluid intake has increased, primarily consisting of coffee and tea, and she has ceased consumption of Coke and soft drinks. She consumed a glass of wine for the first time in 4 to 5 weeks. She takes a stool softener for constipation, which has resulted in regular bowel movements,  "occurring twice daily. She has hemorrhoids that cause itching, particularly when passing hard or large stools. She applies Tucks or Proctofoam for severe itching.       Objective   Vital Signs:  /60   Pulse 80   Temp 97.4 °F (36.3 °C) (Temporal)   Resp 14   Ht 167.6 cm (66\")   Wt 98.2 kg (216 lb 6.4 oz)   SpO2 96%   BMI 34.93 kg/m²   Estimated body mass index is 34.93 kg/m² as calculated from the following:    Height as of this encounter: 167.6 cm (66\").    Weight as of this encounter: 98.2 kg (216 lb 6.4 oz).               Physical Exam  Vitals and nursing note reviewed.   Constitutional:       General: She is not in acute distress.     Appearance: She is well-developed. She is not ill-appearing or diaphoretic.   HENT:      Head: Normocephalic and atraumatic.   Eyes:      General:         Right eye: No discharge.         Left eye: No discharge.      Conjunctiva/sclera: Conjunctivae normal.   Cardiovascular:      Rate and Rhythm: Normal rate and regular rhythm.      Heart sounds: Normal heart sounds.   Pulmonary:      Effort: Pulmonary effort is normal.      Breath sounds: Normal breath sounds. No wheezing.   Abdominal:      General: Bowel sounds are normal. There is no distension.      Palpations: Abdomen is soft. There is no mass.      Tenderness: There is no abdominal tenderness.      Hernia: There is no hernia in the left inguinal area or right inguinal area.   Genitourinary:     Labia:         Right: Rash present.         Left: Rash present.       Urethra: No prolapse.      Vagina: No prolapsed vaginal walls.      Uterus: No uterine prolapse.       Adnexa:         Right: No mass or fullness.          Left: No mass or fullness.        Rectum: External hemorrhoid present.   Musculoskeletal:         General: No deformity.      Comments: Gait smooth and steady   Lymphadenopathy:      Lower Body: No right inguinal adenopathy. No left inguinal adenopathy.   Skin:     General: Skin is warm and dry. "   Neurological:      Mental Status: She is alert and oriented to person, place, and time.   Psychiatric:         Mood and Affect: Mood normal.         Behavior: Behavior normal.          Physical Exam       Result Review :            Results  Laboratory Studies  Urinalysis: No bacteria, trace leukocytes.                Assessment and Plan     Diagnoses and all orders for this visit:    1. Burning with urination (Primary)  -     POC Urinalysis Dipstick, Automated  -     Urine Culture - Urine, Urine, Clean Catch  -     nitrofurantoin, macrocrystal-monohydrate, (Macrobid) 100 MG capsule; Take 1 capsule by mouth 2 (Two) Times a Day.  Dispense: 14 capsule; Refill: 0    2. COPD with exacerbation  -     Fluticasone-Umeclidin-Vilant (Trelegy Ellipta) 100-62.5-25 MCG/ACT inhaler; Inhale 1 puff Daily.  Dispense: 1 each; Refill: 3        Assessment & Plan    Dysuria  POC UA not definitive for UTI.  Will go ahead and give Macrobid to have on hand if needed while awaiting results of culture.  Patient will not taken last worsening symptoms.  I did not appreciate significant bladder or vaginal prolapse which limited exam.  No speculum used.  She did have quite a bit of redness in her labia, introitus and will use Calmoseptine at home which is OTC-this may be causing some of her dysuria symptoms.     Additionally, a urinalysis will be performed. A referral to a urogynecologist will be made to investigate the possibility of a prolapse. The patient was informed about the potential benefits of pelvic floor exercises and a physical therapist specializing in pelvic floor exercises. The possibility of a pessary was also discussed.    COPD  Stable.  Will continue Trelegy and as needed albuterol.  Discussed prevention of exacerbation especially during high weather alert days.            Follow Up     No follow-ups on file.  Patient was given instructions and counseling regarding her condition or for health maintenance advice. Please see  specific information pulled into the AVS if appropriate.    Patient or patient representative verbalized consent for the use of Ambient Listening during the visit with  SCHUYLER Baptiste for chart documentation. 7/1/2024  06:24 EDT      Answers submitted by the patient for this visit:  Primary Reason for Visit (Submitted on 6/5/2024)  What is the primary reason for your visit?: Painful Urination

## 2024-06-15 LAB
BACTERIA UR CULT: ABNORMAL
BACTERIA UR CULT: ABNORMAL
OTHER ANTIBIOTIC SUSC ISLT: ABNORMAL

## 2024-08-01 ENCOUNTER — OFFICE VISIT (OUTPATIENT)
Dept: FAMILY MEDICINE CLINIC | Facility: CLINIC | Age: 74
End: 2024-08-01
Payer: MEDICARE

## 2024-08-01 VITALS
SYSTOLIC BLOOD PRESSURE: 100 MMHG | WEIGHT: 216 LBS | HEART RATE: 75 BPM | RESPIRATION RATE: 14 BRPM | HEIGHT: 66 IN | BODY MASS INDEX: 34.72 KG/M2 | DIASTOLIC BLOOD PRESSURE: 60 MMHG | OXYGEN SATURATION: 93 % | TEMPERATURE: 97.7 F

## 2024-08-01 DIAGNOSIS — E66.9 OBESITY (BMI 30.0-34.9): ICD-10-CM

## 2024-08-01 DIAGNOSIS — R73.03 PREDIABETES: ICD-10-CM

## 2024-08-01 DIAGNOSIS — Z00.00 ENCOUNTER FOR ANNUAL WELLNESS VISIT (AWV) IN MEDICARE PATIENT: Primary | ICD-10-CM

## 2024-08-01 DIAGNOSIS — N39.46 MIXED STRESS AND URGE URINARY INCONTINENCE: ICD-10-CM

## 2024-08-01 DIAGNOSIS — E78.00 PURE HYPERCHOLESTEROLEMIA: ICD-10-CM

## 2024-08-01 DIAGNOSIS — F32.9 REACTIVE DEPRESSION: ICD-10-CM

## 2024-08-01 DIAGNOSIS — E03.9 HYPOTHYROIDISM (ACQUIRED): ICD-10-CM

## 2024-08-01 PROCEDURE — 1160F RVW MEDS BY RX/DR IN RCRD: CPT | Performed by: NURSE PRACTITIONER

## 2024-08-01 PROCEDURE — 1159F MED LIST DOCD IN RCRD: CPT | Performed by: NURSE PRACTITIONER

## 2024-08-01 PROCEDURE — 1125F AMNT PAIN NOTED PAIN PRSNT: CPT | Performed by: NURSE PRACTITIONER

## 2024-08-01 PROCEDURE — G0439 PPPS, SUBSEQ VISIT: HCPCS | Performed by: NURSE PRACTITIONER

## 2024-08-01 NOTE — PROGRESS NOTES
The ABCs of the Annual Wellness Visit  Subsequent Medicare Wellness Visit    Subjective    Sharon Gonzalez is a 74 y.o. female who presents for a Subsequent Medicare Wellness Visit.    The following portions of the patient's history were reviewed and   updated as appropriate: allergies, current medications, past family history, past medical history, past social history, past surgical history, and problem list.    Compared to one year ago, the patient feels her physical   health is the same.    Compared to one year ago, the patient feels her mental   health is the same.    Recent Hospitalizations:  She was not admitted to the hospital during the last year.       Current Medical Providers:  Patient Care Team:  Gabrielle Rush APRN as PCP - General (Nurse Practitioner)    Outpatient Medications Prior to Visit   Medication Sig Dispense Refill    albuterol (PROVENTIL) (2.5 MG/3ML) 0.083% nebulizer solution Take 2.5 mg by nebulization Every 4 (Four) Hours As Needed for Wheezing. 50 vial 12    albuterol sulfate HFA (ProAir HFA) 108 (90 Base) MCG/ACT inhaler Inhale 2 puffs Every 4 (Four) Hours As Needed for Wheezing or Shortness of Air. May sub for insurance approved equivalent. provide spacer. 18 g 3    B Complex Vitamins (B COMPLEX-B12) tablet Take  by mouth.      buPROPion XL (WELLBUTRIN XL) 150 MG 24 hr tablet TAKE ONE TABLET BY MOUTH EVERY MORNING 90 tablet 1    Cholecalciferol (VITAMIN D) 2000 UNITS tablet Take  by mouth.      docusate sodium 100 MG capsule Take 1 capsule by mouth Daily.      Fluticasone-Umeclidin-Vilant (Trelegy Ellipta) 100-62.5-25 MCG/ACT inhaler Inhale 1 puff Daily. 1 each 3    levothyroxine (SYNTHROID, LEVOTHROID) 100 MCG tablet TAKE 1 TABLET BY MOUTH DAILY 90 tablet 1    nitrofurantoin, macrocrystal-monohydrate, (Macrobid) 100 MG capsule Take 1 capsule by mouth 2 (Two) Times a Day. 14 capsule 0    Unable to find 1 each 1 (One) Time. Med Name: vital proteins with collagen powder       No  "facility-administered medications prior to visit.       No opioid medication identified on active medication list. I have reviewed chart for other potential  high risk medication/s and harmful drug interactions in the elderly.        Aspirin is not on active medication list.  Aspirin use is not indicated based on review of current medical condition/s. Risk of harm outweighs potential benefits.  .    Patient Active Problem List   Diagnosis    Hypothyroidism (acquired)    Skin lesion of face    Chronic right-sided low back pain with sciatica    OAB (overactive bladder)    Reactive depression     Advance Care Planning   Advance Care Planning     Advance Directive is not on file.  ACP discussion was held with the patient during this visit. Patient does not have an advance directive, information provided.     Objective    Vitals:    08/01/24 1525   BP: 100/60   Pulse: 75   Resp: 14   Temp: 97.7 °F (36.5 °C)   TempSrc: Temporal   SpO2: 93%   Weight: 98 kg (216 lb)   Height: 167.6 cm (66\")   PainSc:   1   PainLoc: Generalized     Estimated body mass index is 34.86 kg/m² as calculated from the following:    Height as of this encounter: 167.6 cm (66\").    Weight as of this encounter: 98 kg (216 lb).    BMI is >= 30 and <35. (Class 1 Obesity). The following options were offered after discussion;: information on healthy weight added to patient's after visit summary       Does the patient have evidence of cognitive impairment? No          HEALTH RISK ASSESSMENT    Smoking Status:  Social History     Tobacco Use   Smoking Status Some Days    Current packs/day: 0.25    Average packs/day: 0.3 packs/day for 64.6 years (16.1 ttl pk-yrs)    Types: Cigarettes    Start date: 1960    Passive exposure: Never   Smokeless Tobacco Never     Alcohol Consumption:  Social History     Substance and Sexual Activity   Alcohol Use Yes    Alcohol/week: 1.0 standard drink of alcohol    Types: 1 Glasses of wine per week     Fall Risk " Screen:    ALISON Fall Risk Assessment was completed, and patient is at LOW risk for falls.Assessment completed on:2024    Depression Screenin/1/2024     4:00 PM   PHQ-2/PHQ-9 Depression Screening   Little Interest or Pleasure in Doing Things 1-->several days   Feeling Down, Depressed or Hopeless 1-->several days   Trouble Falling or Staying Asleep, or Sleeping Too Much 1-->several days   Feeling Tired or Having Little Energy 3-->nearly every day   Poor Appetite or Overeating 2-->more than half the days   Feeling Bad about Yourself - or that You are a Failure or Have Let Yourself or Your Family Down 1-->several days   Trouble Concentrating on Things, Such as Reading the Newspaper or Watching Television 1-->several days   Moving or Speaking So Slowly that Other People Could Have Noticed? Or the Opposite - Being So Fidgety 1-->several days   Thoughts that You Would be Better Off Dead or of Hurting Yourself in Some Way 0-->not at all   PHQ-9: Brief Depression Severity Measure Score 11   If You Checked Off Any Problems, How Difficult Have These Problems Made It For You to Do Your Work, Take Care of Things at Home, or Get Along with Other People? somewhat difficult       Health Habits and Functional and Cognitive Screenin/31/2024     5:17 PM   Functional & Cognitive Status   Do you have difficulty preparing food and eating? No   Do you have difficulty bathing yourself, getting dressed or grooming yourself? No   Do you have difficulty using the toilet? No   Do you have difficulty moving around from place to place? No   Do you have trouble with steps or getting out of a bed or a chair? Yes   Current Diet Frequent Junk Food   Dental Exam Up to date   Eye Exam Up to date   Exercise (times per week) 0 times per week   Current Exercises Include No Regular Exercise   Do you need help using the phone?  No   Are you deaf or do you have serious difficulty hearing?  No   Do you need help to go to places out of  walking distance? No   Do you need help shopping? No   Do you need help preparing meals?  No   Do you need help with housework?  Yes   Do you need help with laundry? No   Do you need help taking your medications? No   Do you need help managing money? No   Do you ever drive or ride in a car without wearing a seat belt? No   Have you felt unusual stress, anger or loneliness in the last month? Yes   Who do you live with? Alone   If you need help, do you have trouble finding someone available to you? No   Have you been bothered in the last four weeks by sexual problems? No   Do you have difficulty concentrating, remembering or making decisions? Yes       Age-appropriate Screening Schedule:  Refer to the list below for future screening recommendations based on patient's age, sex and/or medical conditions. Orders for these recommended tests are listed in the plan section. The patient has been provided with a written plan.    Health Maintenance   Topic Date Due    TDAP/TD VACCINES (1 - Tdap) Never done    LIPID PANEL  07/24/2024    INFLUENZA VACCINE  08/01/2024    BMI FOLLOWUP  08/02/2024 (Originally 7/31/2024)    RSV Vaccine - Adults (1 - 1-dose 60+ series) 09/01/2024 (Originally 4/7/2009)    COVID-19 Vaccine (4 - 2023-24 season) 11/29/2024 (Originally 9/1/2023)    ANNUAL WELLNESS VISIT  08/01/2025    COLORECTAL CANCER SCREENING  07/01/2030    Pneumococcal Vaccine 65+  Completed    HEPATITIS C SCREENING  Addressed    DXA SCAN  Discontinued    ZOSTER VACCINE  Discontinued                  CMS Preventative Services Quick Reference  Risk Factors Identified During Encounter -urinary incontinence and loneliness  Immunizations Discussed/Encouraged: Tdap and COVID19  The above risks/problems have been discussed with the patient.  Pertinent information has been shared with the patient in the After Visit Summary.  An After Visit Summary and PPPS were made available to the patient.    Follow Up:   Next Medicare Wellness visit to be  scheduled in 1 year.       Additional E&M Note during same encounter follows:  Patient has multiple medical problems which are significant and separately identifiable that require additional work above and beyond the Medicare Wellness Visit.      Chief Complaint  Medicare Wellness-subsequent    Subjective        HPI  Sharon Gonzalez is also being seen today for AWV    History of Present Illness  The patient is a 74-year-old female who is here for an annual wellness visit and has a list of questions.    She has developed several skin tags, one of which she frequently scratches when she wipes her nose. The tag has become red and inflamed, leading her to suspect a need for removal. Additionally, she has moles on her back that she wishes to have examined.    She is experiencing urinary incontinence, which she believes is causing her depression. She describes the incontinence as similar to a vicious cycle.  Thinks it leads to some social isolation as she would not want to get a part-time job or volunteer due to incontinence.  It prevents her from socializing at times.  For example friend has invited her over to her house to swim which patient would like to do but she would not want to urinate in her pool.  She also experiences the same issue when she exercises or goes to the gym. The incontinence is severe enough to soak adult incontinent wear. She is considering medication to manage the frequency of her urination. The incontinence is interfering with her life.  Reports that she dribbles continuously throughout the day and night.    Her physical and mental health have remained stable compared to the previous year. She has not required any overnight hospitalizations in the past year. She is not taking baby aspirin. She was taking vitamin B for energy, but discontinued its use. She has an advanced directive and a living will, with designated healthcare surrogate, although not sure if written in Living Will. She denies any  "memory issues or hearing problems. She is due for the influenza vaccine in 2024. She has not received the Tdap vaccine.   She experiences knee pain, which she thinks would improve with weight loss and more activity. She is constantly short of breath, which has not worsened.  COPD remains stable with no exacerbations recently and no hospitalizations.  She uses Trelegy as needed, particularly during hot weather. She denies chest pain or heart palpitations. She takes stool softeners daily to prevent constipation. She denies blood in her stool, but does have hemorrhoids, occasionally seeing blood on the toilet paper. She experiences itching in her left ear. She had ear infections as a child. She visits the dentist every four months due to dental implants. She has bone loss, so she uses a retainer.     FAMILY HISTORY  She is not aware of any personal or family history of neuroendocrine tumors or thyroid adenocarcinoma. Her paternal grandfather  of a brain tumor.    IMMUNIZATIONS  She is up to date on all her vaccines, other than Tdap and will get that at her pharmacy.  Discussed recent outbreak of pertussis in [place] and highly recommend.            Objective   Vital Signs:  /60   Pulse 75   Temp 97.7 °F (36.5 °C) (Temporal)   Resp 14   Ht 167.6 cm (66\")   Wt 98 kg (216 lb)   SpO2 93%   BMI 34.86 kg/m²     Physical Exam  Vitals and nursing note reviewed.   Constitutional:       General: She is not in acute distress.     Appearance: She is well-developed. She is not ill-appearing.   HENT:      Head: Normocephalic and atraumatic.      Right Ear: Ear canal and external ear normal. Tympanic membrane is scarred.      Left Ear: Tympanic membrane, ear canal and external ear normal.      Mouth/Throat:      Mouth: Mucous membranes are moist.      Pharynx: Uvula midline. No posterior oropharyngeal erythema.   Eyes:      General: No scleral icterus.        Right eye: No discharge.         Left eye: No " discharge.      Conjunctiva/sclera: Conjunctivae normal.   Neck:      Thyroid: No thyromegaly.   Cardiovascular:      Rate and Rhythm: Normal rate and regular rhythm.      Heart sounds: Normal heart sounds. No murmur heard.  Pulmonary:      Effort: Pulmonary effort is normal.      Breath sounds: Normal breath sounds.      Comments: Mildly hyperinflated sounding  Abdominal:      General: Bowel sounds are normal. There is no distension.      Palpations: Abdomen is soft.      Tenderness: There is no abdominal tenderness.   Musculoskeletal:         General: No deformity.      Cervical back: Neck supple.      Comments: Antalgia and a little unsteady when stepping up to exam table due to knee arthralgia   Lymphadenopathy:      Cervical: No cervical adenopathy.   Skin:     General: Skin is warm and dry.      Comments: Scattered moles on back, none of which have suspicious appearance, no ticks   Neurological:      General: No focal deficit present.      Mental Status: She is alert and oriented to person, place, and time.   Psychiatric:         Mood and Affect: Mood normal.         Behavior: Behavior normal.         Thought Content: Thought content normal.         Physical Exam                  Results                Assessment and Plan   Diagnoses and all orders for this visit:    1. Encounter for annual wellness visit (AWV) in Medicare patient (Primary)    2. Mixed stress and urge urinary incontinence  -     Ambulatory Referral to Gynecologic Urology    3. Hypothyroidism (acquired)  -     TSH    4. Reactive depression  -     CBC (No Diff)    5. Prediabetes  -     Hemoglobin A1c    6. Pure hypercholesterolemia  -     Lipid Panel With LDL / HDL Ratio    7. Obesity (BMI 30.0-34.9)  -     CBC (No Diff)  -     Comprehensive Metabolic Panel  -     Hemoglobin A1c  -     Lipid Panel With LDL / HDL Ratio  -     TSH        Assessment & Plan  Annual wellness visit.  The Tdap vaccine is recommended. Lab orders will be  placed.  Appropriate health maintenance and prevention topics specific for this patient were discussed today.  Additionally, health goals, and health concerns addressed as appropriate.  Pt was encouraged to stay up to date on recommended screenings and vaccines based on USPSTF guidelines.     Skin tags/moles  Dermatology for removal of the skin tags and skin surveillance. No suspicious skin findings today, but recommend yrly eval    Mixed incontinence.  A referral to Urogynecology will be made. Physical therapy for pelvic floor exercises will be initiated. Vitamin B supplementation will be resumed. Lifestyle modifications, including gym workouts, are recommended.    Hypothyroidism  TSH has been therapeutic.  Check with labs and adjust medication as indicated    MDD  Mostly stable but I think it is being exacerbated by some loneliness, isolation due to incontinence which I think is probably the biggest problem today.  We discussed medications but I am not sure how helpful they would be an side effects can be significant.  I think she would be best served from eval from urogynecology.  No refills needed on Wellbutrin today    Hyperlipidemia  Check, recommendations based on labs.  Not on statin    Prediabetes  Has been stable, check A1c.  Recommendations pending labs.    Obesity  Impacting patient's ADLs, physical and mental health.  She has previously tried Ozempic and did not tolerate it well, not sure if she was on starting dose or she started at higher dose as I was not prescriber.  We did discuss possibility of GLP-1 for both prediabetes and obesity.    COPD  Stable.  Continue current medications.  No refills needed today            Follow Up   No follow-ups on file.  Patient was given instructions and counseling regarding her condition or for health maintenance advice. Please see specific information pulled into the AVS if appropriate.    Patient or patient representative verbalized consent for the use of Ambient  Listening during the visit with  SCHUYLER Baptiste for chart documentation. 8/2/2024  06:43 EDT

## 2024-08-07 ENCOUNTER — LAB (OUTPATIENT)
Dept: LAB | Facility: HOSPITAL | Age: 74
End: 2024-08-07
Payer: MEDICARE

## 2024-08-07 LAB
ALBUMIN SERPL-MCNC: 4 G/DL (ref 3.5–5.2)
ALBUMIN/GLOB SERPL: 1.4 G/DL
ALP SERPL-CCNC: 106 U/L (ref 39–117)
ALT SERPL W P-5'-P-CCNC: 11 U/L (ref 1–33)
ANION GAP SERPL CALCULATED.3IONS-SCNC: 8.7 MMOL/L (ref 5–15)
AST SERPL-CCNC: 14 U/L (ref 1–32)
BILIRUB SERPL-MCNC: 0.2 MG/DL (ref 0–1.2)
BUN SERPL-MCNC: 15 MG/DL (ref 8–23)
BUN/CREAT SERPL: 13.8 (ref 7–25)
CALCIUM SPEC-SCNC: 9.3 MG/DL (ref 8.6–10.5)
CHLORIDE SERPL-SCNC: 105 MMOL/L (ref 98–107)
CHOLEST SERPL-MCNC: 186 MG/DL (ref 0–200)
CO2 SERPL-SCNC: 30.3 MMOL/L (ref 22–29)
CREAT SERPL-MCNC: 1.09 MG/DL (ref 0.57–1)
DEPRECATED RDW RBC AUTO: 54.9 FL (ref 37–54)
EGFRCR SERPLBLD CKD-EPI 2021: 53.1 ML/MIN/1.73
ERYTHROCYTE [DISTWIDTH] IN BLOOD BY AUTOMATED COUNT: 14.5 % (ref 12.3–15.4)
GLOBULIN UR ELPH-MCNC: 2.8 GM/DL
GLUCOSE SERPL-MCNC: 96 MG/DL (ref 65–99)
HBA1C MFR BLD: 5.7 % (ref 4.8–5.6)
HCT VFR BLD AUTO: 46.1 % (ref 34–46.6)
HDLC SERPL-MCNC: 67 MG/DL (ref 40–60)
HGB BLD-MCNC: 14.6 G/DL (ref 12–15.9)
LDLC SERPL CALC-MCNC: 105 MG/DL (ref 0–100)
LDLC/HDLC SERPL: 1.55 {RATIO}
MCH RBC QN AUTO: 32.3 PG (ref 26.6–33)
MCHC RBC AUTO-ENTMCNC: 31.7 G/DL (ref 31.5–35.7)
MCV RBC AUTO: 102 FL (ref 79–97)
PLATELET # BLD AUTO: 163 10*3/MM3 (ref 140–450)
PMV BLD AUTO: 9.6 FL (ref 6–12)
POTASSIUM SERPL-SCNC: 4.4 MMOL/L (ref 3.5–5.2)
PROT SERPL-MCNC: 6.8 G/DL (ref 6–8.5)
RBC # BLD AUTO: 4.52 10*6/MM3 (ref 3.77–5.28)
SODIUM SERPL-SCNC: 144 MMOL/L (ref 136–145)
TRIGL SERPL-MCNC: 77 MG/DL (ref 0–150)
TSH SERPL DL<=0.05 MIU/L-ACNC: 1.62 UIU/ML (ref 0.27–4.2)
VLDLC SERPL-MCNC: 14 MG/DL (ref 5–40)
WBC NRBC COR # BLD AUTO: 5.88 10*3/MM3 (ref 3.4–10.8)

## 2024-08-07 PROCEDURE — 84443 ASSAY THYROID STIM HORMONE: CPT | Performed by: NURSE PRACTITIONER

## 2024-08-07 PROCEDURE — 80061 LIPID PANEL: CPT | Performed by: NURSE PRACTITIONER

## 2024-08-07 PROCEDURE — 83036 HEMOGLOBIN GLYCOSYLATED A1C: CPT | Performed by: NURSE PRACTITIONER

## 2024-08-07 PROCEDURE — 85027 COMPLETE CBC AUTOMATED: CPT | Performed by: NURSE PRACTITIONER

## 2024-08-07 PROCEDURE — 80053 COMPREHEN METABOLIC PANEL: CPT | Performed by: NURSE PRACTITIONER

## 2024-08-08 DIAGNOSIS — R71.8 OTHER ABNORMALITY OF RED BLOOD CELLS: ICD-10-CM

## 2024-08-08 DIAGNOSIS — D75.89 MACROCYTOSIS WITHOUT ANEMIA: Primary | ICD-10-CM

## 2024-08-09 RX ORDER — OXYBUTYNIN CHLORIDE 10 MG/1
10 TABLET, EXTENDED RELEASE ORAL DAILY PRN
Qty: 30 TABLET | Refills: 0 | Status: SHIPPED | OUTPATIENT
Start: 2024-08-09

## 2024-08-16 DIAGNOSIS — E03.9 HYPOTHYROIDISM (ACQUIRED): ICD-10-CM

## 2024-08-16 RX ORDER — LEVOTHYROXINE SODIUM 0.1 MG/1
100 TABLET ORAL DAILY
Qty: 90 TABLET | Refills: 1 | Status: SHIPPED | OUTPATIENT
Start: 2024-08-16

## 2024-08-16 RX ORDER — BUPROPION HYDROCHLORIDE 150 MG/1
150 TABLET ORAL EVERY MORNING
Qty: 90 TABLET | Refills: 1 | Status: SHIPPED | OUTPATIENT
Start: 2024-08-16

## 2024-08-16 NOTE — TELEPHONE ENCOUNTER
Rx Refill Note  Requested Prescriptions     Pending Prescriptions Disp Refills    levothyroxine (SYNTHROID, LEVOTHROID) 100 MCG tablet [Pharmacy Med Name: LEVOTHYROXINE 100 MCG TABLET] 90 tablet 1     Sig: TAKE 1 TABLET BY MOUTH DAILY    buPROPion XL (WELLBUTRIN XL) 150 MG 24 hr tablet [Pharmacy Med Name: buPROPion HCL  MG TABLET] 90 tablet 1     Sig: TAKE 1 TABLET BY MOUTH EVERY MORNING      Last office visit with prescribing clinician: 8/1/2024   Last telemedicine visit with prescribing clinician: Visit date not found   Next office visit with prescribing clinician: Visit date not found                         Would you like a call back once the refill request has been completed: [] Yes [] No    If the office needs to give you a call back, can they leave a voicemail: [] Yes [] No    Sukhdev Singh Rep  08/16/24, 16:20 EDT

## 2024-09-05 RX ORDER — OXYBUTYNIN CHLORIDE 10 MG/1
10 TABLET, EXTENDED RELEASE ORAL DAILY PRN
Qty: 90 TABLET | Refills: 0 | Status: SHIPPED | OUTPATIENT
Start: 2024-09-05

## 2024-09-12 RX ORDER — OXYBUTYNIN CHLORIDE 10 MG/1
TABLET, EXTENDED RELEASE ORAL
Qty: 30 TABLET | OUTPATIENT
Start: 2024-09-12

## 2024-10-08 ENCOUNTER — OFFICE VISIT (OUTPATIENT)
Dept: FAMILY MEDICINE CLINIC | Facility: CLINIC | Age: 74
End: 2024-10-08
Payer: MEDICARE

## 2024-10-08 VITALS
HEART RATE: 71 BPM | TEMPERATURE: 97.1 F | BODY MASS INDEX: 34.78 KG/M2 | RESPIRATION RATE: 14 BRPM | HEIGHT: 66 IN | SYSTOLIC BLOOD PRESSURE: 120 MMHG | DIASTOLIC BLOOD PRESSURE: 80 MMHG | WEIGHT: 216.4 LBS | OXYGEN SATURATION: 94 %

## 2024-10-08 DIAGNOSIS — R73.03 PREDIABETES: Chronic | ICD-10-CM

## 2024-10-08 DIAGNOSIS — E66.811 CLASS 1 OBESITY WITH SERIOUS COMORBIDITY AND BODY MASS INDEX (BMI) OF 34.0 TO 34.9 IN ADULT, UNSPECIFIED OBESITY TYPE: Chronic | ICD-10-CM

## 2024-10-08 DIAGNOSIS — Z76.89 ENCOUNTER FOR WEIGHT MANAGEMENT: Primary | ICD-10-CM

## 2024-10-08 DIAGNOSIS — J44.9 CHRONIC OBSTRUCTIVE PULMONARY DISEASE, UNSPECIFIED COPD TYPE: Chronic | ICD-10-CM

## 2024-10-08 PROCEDURE — 1126F AMNT PAIN NOTED NONE PRSNT: CPT | Performed by: NURSE PRACTITIONER

## 2024-10-08 PROCEDURE — 1160F RVW MEDS BY RX/DR IN RCRD: CPT | Performed by: NURSE PRACTITIONER

## 2024-10-08 PROCEDURE — 1159F MED LIST DOCD IN RCRD: CPT | Performed by: NURSE PRACTITIONER

## 2024-10-08 PROCEDURE — 99214 OFFICE O/P EST MOD 30 MIN: CPT | Performed by: NURSE PRACTITIONER

## 2024-10-08 NOTE — PROGRESS NOTES
"Chief Complaint  Encounter for weight management    Subjective        Sharon Gonzalez presents to [facility]  History of Present Illness    History of Present Illness  The patient is a 74-year-old female who presents for evaluation of weight loss.    She is interested in trying Wegovy for weight loss, even though she experienced adverse effects with Ozempic in the past. However, she had tried Ozempic not prescribed for her and she is unsure what dose she tried. She has previously tried phentermine, but it did not significantly reduce her appetite. Her goal is to lose 20 to 30 pounds and then discontinue the medication.    She has been following a GoLo diet, which emphasizes a 2:1 ratio of proteins to carbohydrates, along with vegetables and fats. However, she finds it challenging to maintain this diet consistently. Her typical eating pattern includes one substantial meal and two smaller meals or snacks. She adds orange drops to her water for flavor and enjoys drinking Pure Leaf tea, which contains no sugar.    She expresses concern about potential excess skin after weight loss and currently feels too heavy and short of breath to engage in physical activities such as going to the gym. She uses Trelegy as needed and albuterol when she experiences difficulty breathing. Denies recent COPD exacerbations.  No chest pain, palpitations.  No current GI changes from baseline.        Objective   Vital Signs:  /80   Pulse 71   Temp 97.1 °F (36.2 °C) (Infrared)   Resp 14   Ht 167.6 cm (66\")   Wt 98.2 kg (216 lb 6.4 oz)   SpO2 94%   BMI 34.93 kg/m²   Estimated body mass index is 34.93 kg/m² as calculated from the following:    Height as of this encounter: 167.6 cm (66\").    Weight as of this encounter: 98.2 kg (216 lb 6.4 oz).               Physical Exam  Vitals and nursing note reviewed.   Constitutional:       General: She is not in acute distress.     Appearance: She is " well-developed. She is obese. She is not ill-appearing or diaphoretic.   HENT:      Head: Normocephalic and atraumatic.   Eyes:      General:         Right eye: No discharge.         Left eye: No discharge.      Conjunctiva/sclera: Conjunctivae normal.   Cardiovascular:      Rate and Rhythm: Normal rate and regular rhythm.      Heart sounds: Normal heart sounds.   Pulmonary:      Effort: Pulmonary effort is normal.      Comments: Hyperinflated-no adventitious BS B/L  Abdominal:      General: Bowel sounds are normal.      Palpations: Abdomen is soft.      Tenderness: There is no abdominal tenderness.   Musculoskeletal:         General: No deformity.      Comments: Gait smooth and steady   Skin:     General: Skin is warm and dry.   Neurological:      Mental Status: She is alert and oriented to person, place, and time.   Psychiatric:         Mood and Affect: Mood normal.         Behavior: Behavior normal.        Physical Exam  Vital Signs  BMI indicates a value of 34.     Result Review :            Results  Laboratory Studies  A1c has been consistently at 5.7.                Assessment and Plan     Diagnoses and all orders for this visit:    1. Class 1 obesity with serious comorbidity and body mass index (BMI) of 34.0 to 34.9 in adult, unspecified obesity type (Primary)  -     Discontinue: Semaglutide-Weight Management 0.25 MG/0.5ML solution auto-injector; Inject 0.5 mL under the skin into the appropriate area as directed 1 (One) Time Per Week.  Dispense: 2 mL; Refill: 0        Assessment & Plan  1. Weight Management.  A prescription for Wegovy 0.25 mg once weekly was provided. No contraindications. Since not covered by insurance, will need compound.  We discussed inherent risks of compounds. We have had multiple conversations related to weight mgmt and tried various strategies, medication which have not been effective. Consent obtained as she wishes to proceed. The potential side effects, including nausea, vomiting,  diarrhea, constipation, and delayed stomach emptying, were discussed. She was advised to start with a low dose to help tolerate the medication. She was instructed to manage her carbohydrate intake, avoid large meals, and ensure adequate hydration to prevent kidney issues. The importance of rotating injection sites and maintaining hygiene was emphasized. She was also advised to increase her protein intake, avoid overeating, abstain from alcohol, and ensure adequate hydration. Regular exercise was recommended to help maintain weight loss.    2. Prediabetes.  Her A1c level is at the threshold of prediabetes. She was advised to continue monitoring her blood sugar levels and maintain a balanced diet to prevent progression to diabetes. Weight loss should improve A1C.     3. COPD  She uses Trelegy as needed and was advised to use it more regularly to manage allergy symptoms anastasiia this time of yr. She was also instructed to use her albuterol inhaler when experiencing wheezing or other symptoms and prior to activity.    Follow-up  Return in 1 month for follow up.            Follow Up     No follow-ups on file.  Patient was given instructions and counseling regarding her condition or for health maintenance advice. Please see specific information pulled into the AVS if appropriate.    Patient or patient representative verbalized consent for the use of Ambient Listening during the visit with  SCHUYLER Baptiste for chart documentation. 10/20/2024  14:53 EDT      Answers submitted by the patient for this visit:  Other (Submitted on 10/7/2024)  Please describe your symptoms.: I want to discuss the weight loss drugs that are available and see if my weight problem can be helped.  Have you had these symptoms before?: Yes  How long have you been having these symptoms?: Greater than 2 weeks  Please list any medications you are currently taking for this condition.: All my medications are in my chart already  Primary Reason for Visit  (Submitted on 10/7/2024)  What is the primary reason for your visit?: Problem Not Listed

## 2024-10-09 ENCOUNTER — TELEPHONE (OUTPATIENT)
Dept: FAMILY MEDICINE CLINIC | Facility: CLINIC | Age: 74
End: 2024-10-09
Payer: MEDICARE

## 2024-10-09 NOTE — TELEPHONE ENCOUNTER
"PHARM WANTS TO KNOW IF ITS OKAY TO COMPOUND MEDS FOR PT EITHER CALL BACK OR REFAX W/ NOTE \"OKAY TO COMPOUND\"  [phone] PHONE  [phone] FAX  "

## 2024-10-09 NOTE — TELEPHONE ENCOUNTER
"PATIENT STATED THAT PHARMACY NEEDED THE SEMAGLUTIDE CALLED IN AND NOT THE NAME BRAND KELSEY.     [facility] \"Compounds Only\" - [place], [place] - [address] - [phone]  - [phone]  [phone]   "

## 2024-10-10 DIAGNOSIS — E66.811 CLASS 1 OBESITY WITH SERIOUS COMORBIDITY AND BODY MASS INDEX (BMI) OF 34.0 TO 34.9 IN ADULT, UNSPECIFIED OBESITY TYPE: ICD-10-CM

## 2024-10-20 PROBLEM — R73.03 PREDIABETES: Status: ACTIVE | Noted: 2024-10-20

## 2024-10-20 PROBLEM — J44.9 CHRONIC OBSTRUCTIVE PULMONARY DISEASE: Status: ACTIVE | Noted: 2024-10-20

## 2024-11-01 ENCOUNTER — TELEPHONE (OUTPATIENT)
Dept: FAMILY MEDICINE CLINIC | Facility: CLINIC | Age: 74
End: 2024-11-01

## 2024-11-01 NOTE — TELEPHONE ENCOUNTER
Caller: Sharon Gonzalez    Relationship: Self    Best call back number: [phone]     What is the best time to reach you: ANY    Who are you requesting to speak with (clinical staff, provider,  specific staff member): PCP        What was the call regarding: PATIENT WAS NOT SURE IF SHE WAS SUPPOSED TO SCHEDULE A FOLLOW UP APPOINTMENT AFTER STARTING     Semaglutide-Weight Management. SHE STATES SHE IS DOING WELL AND IS HAVING NO PROBLEMS WITH THE MEDICATION. PLEASE CALL IF SHE NEEDS TO SCHEDULE A FOLLOW UP APPOINTMENT. SHE WILL TAKE HER LAST DOSE ON 11/03/24       Is it okay if the provider responds through MyChart: PHONE CALL

## 2024-11-08 ENCOUNTER — OFFICE VISIT (OUTPATIENT)
Dept: FAMILY MEDICINE CLINIC | Facility: CLINIC | Age: 74
End: 2024-11-08
Payer: MEDICARE

## 2024-11-08 VITALS
SYSTOLIC BLOOD PRESSURE: 128 MMHG | RESPIRATION RATE: 14 BRPM | TEMPERATURE: 97.1 F | OXYGEN SATURATION: 99 % | BODY MASS INDEX: 33.25 KG/M2 | DIASTOLIC BLOOD PRESSURE: 80 MMHG | HEART RATE: 69 BPM | WEIGHT: 206.9 LBS | HEIGHT: 66 IN

## 2024-11-08 DIAGNOSIS — E66.811 CLASS 1 OBESITY WITH SERIOUS COMORBIDITY AND BODY MASS INDEX (BMI) OF 34.0 TO 34.9 IN ADULT, UNSPECIFIED OBESITY TYPE: ICD-10-CM

## 2024-11-08 DIAGNOSIS — Z76.89 ENCOUNTER FOR WEIGHT MANAGEMENT: Primary | ICD-10-CM

## 2024-11-08 DIAGNOSIS — E66.811 OBESITY (BMI 30.0-34.9): ICD-10-CM

## 2024-11-08 NOTE — PROGRESS NOTES
"Chief Complaint  Encounter for weight management    Subjective        Sharon Gonzalez presents to [facility]  History of Present Illness    History of Present Illness  The patient is a 74-year-old female who presents for a follow-up on weight loss medication.    She reports feeling better than she has in the past decade, with increased energy levels. She has been on Wegovy 0.25 for a month and is considering increasing the dose to 0.3. She has enough medication for one more dose. She has lost 8 pounds and is aiming to lose a total of 25 pounds. She plans to join a gym once she reaches her weight loss goal. She has also noticed an improvement in her breathing.    She has experienced nausea only once and is mindful of her diet, particularly increasing her protein intake. Her diet now includes protein bars, plant-based bread, and oatmeal with high protein content. She has switched to almond milk for its lower calorie content and has reduced her coffee intake. She has also increased her water consumption and uses drops to enhance its taste. Her snack choices have shifted to healthier options like cucumbers, radishes, and raw turnips. She has incorporated fruits like bananas and blueberries into her diet, along with yogurt. She has replaced eggs with turkey foley for breakfast. She has noticed a decrease in her appetite and is making an effort to cook more and be mindful of her food choices. She is also trying to incorporate more physical activity into her routine.    She had to take a laxative yesterday and still takes her stool softeners. She has not had a hot dog, potato chip, or sweet in a month.        Objective   Vital Signs:  /80   Pulse 69   Temp 97.1 °F (36.2 °C) (Temporal)   Resp 14   Ht 167.6 cm (66\")   Wt 93.8 kg (206 lb 14.4 oz)   SpO2 99%   BMI 33.39 kg/m²   Estimated body mass index is 33.39 kg/m² as calculated from the following:    Height as of this encounter: " "167.6 cm (66\").    Weight as of this encounter: 93.8 kg (206 lb 14.4 oz).               Physical Exam  Vitals and nursing note reviewed.   Constitutional:       General: She is not in acute distress.     Appearance: She is well-developed. She is not ill-appearing or diaphoretic.   HENT:      Head: Normocephalic and atraumatic.   Eyes:      General:         Right eye: No discharge.         Left eye: No discharge.      Conjunctiva/sclera: Conjunctivae normal.   Cardiovascular:      Rate and Rhythm: Normal rate and regular rhythm.      Heart sounds: Normal heart sounds.   Pulmonary:      Effort: Pulmonary effort is normal.      Breath sounds: Normal breath sounds.   Abdominal:      General: Bowel sounds are normal. There is no distension.      Palpations: Abdomen is soft.      Tenderness: There is no abdominal tenderness.   Musculoskeletal:         General: No deformity.      Comments: Gait smooth and steady   Skin:     General: Skin is warm and dry.   Neurological:      Mental Status: She is alert and oriented to person, place, and time.   Psychiatric:         Mood and Affect: Mood normal.         Behavior: Behavior normal.          Physical Exam  Vital Signs  Blood pressure is 128/80 today.     Result Review :            Results                  Assessment and Plan     Diagnoses and all orders for this visit:    1. Encounter for weight management (Primary)    2. Obesity (BMI 30.0-34.9)        Assessment & Plan  1. Weight management for obesity-improving  She is responding well to the current dosage of Wegovy. She reports significant improvements in her energy levels, reduction in midsection size, and overall well-being. She has experienced minimal nausea and has been carefully monitoring her diet, increasing her protein intake, and avoiding junk food. She will continue with the current dosage of Wegovy for another month. She is advised to send a message via [employer] after her third injection to discuss potential " dosage adjustments. A new prescription for Wegovy will be sent at current dose.  Lab tests will be conducted at the 3-month corina. She is encouraged to continue her healthy eating habits and focus on maintaining muscle mass.              Follow Up     No follow-ups on file.  Patient was given instructions and counseling regarding her condition or for health maintenance advice. Please see specific information pulled into the AVS if appropriate.    Patient or patient representative verbalized consent for the use of Ambient Listening during the visit with  SCHUYLER Baptiste for chart documentation. 11/8/2024  17:48 EST

## 2024-12-04 DIAGNOSIS — E66.811 CLASS 1 OBESITY WITH SERIOUS COMORBIDITY AND BODY MASS INDEX (BMI) OF 34.0 TO 34.9 IN ADULT, UNSPECIFIED OBESITY TYPE: ICD-10-CM

## 2024-12-04 NOTE — TELEPHONE ENCOUNTER
Rx Refill Note  Requested Prescriptions     Pending Prescriptions Disp Refills    Semaglutide-Weight Management 0.25 MG/0.5ML solution auto-injector 2 mL 0     Sig: Inject 0.5 mL under the skin into the appropriate area as directed 1 (One) Time Per Week.      Last office visit with prescribing clinician: 11/8/2024   Last telemedicine visit with prescribing clinician: Visit date not found   Next office visit with prescribing clinician: Visit date not found                         Would you like a call back once the refill request has been completed: [] Yes [] No    If the office needs to give you a call back, can they leave a voicemail: [] Yes [] No    Sukhdev Singh Rep  12/04/24, 16:12 EST

## 2024-12-06 RX ORDER — OXYBUTYNIN CHLORIDE 10 MG/1
TABLET, EXTENDED RELEASE ORAL
Qty: 90 TABLET | Refills: 0 | Status: SHIPPED | OUTPATIENT
Start: 2024-12-06

## 2024-12-06 NOTE — TELEPHONE ENCOUNTER
Rx Refill Note  Requested Prescriptions     Pending Prescriptions Disp Refills    oxybutynin XL (DITROPAN-XL) 10 MG 24 hr tablet [Pharmacy Med Name: oxyBUTYnin CL ER 10 MG TABLET] 90 tablet 0     Sig: TAKE 1 TABLET BY MOUTH DAILY AS NEEDED FOR OVERACTIVE BLADDER      Last office visit with prescribing clinician: 11/8/2024   Last telemedicine visit with prescribing clinician: Visit date not found   Next office visit with prescribing clinician: Visit date not found                         Would you like a call back once the refill request has been completed: [] Yes [] No    If the office needs to give you a call back, can they leave a voicemail: [] Yes [] No    Sukhdev Singh Rep  12/06/24, 13:09 EST

## 2025-01-09 DIAGNOSIS — E66.811 CLASS 1 OBESITY WITH SERIOUS COMORBIDITY AND BODY MASS INDEX (BMI) OF 34.0 TO 34.9 IN ADULT, UNSPECIFIED OBESITY TYPE: ICD-10-CM

## 2025-01-20 ENCOUNTER — OFFICE VISIT (OUTPATIENT)
Dept: FAMILY MEDICINE CLINIC | Facility: CLINIC | Age: 75
End: 2025-01-20
Payer: MEDICARE

## 2025-01-20 ENCOUNTER — HOSPITAL ENCOUNTER (OUTPATIENT)
Dept: GENERAL RADIOLOGY | Facility: HOSPITAL | Age: 75
Discharge: HOME | End: 2025-01-20
Payer: MEDICARE

## 2025-01-20 VITALS
HEIGHT: 66 IN | DIASTOLIC BLOOD PRESSURE: 80 MMHG | HEART RATE: 66 BPM | BODY MASS INDEX: 29.97 KG/M2 | WEIGHT: 186.5 LBS | TEMPERATURE: 97.3 F | RESPIRATION RATE: 14 BRPM | OXYGEN SATURATION: 95 % | SYSTOLIC BLOOD PRESSURE: 112 MMHG

## 2025-01-20 DIAGNOSIS — M25.552 LEFT HIP PAIN: Primary | ICD-10-CM

## 2025-01-20 DIAGNOSIS — M25.552 LEFT HIP PAIN: ICD-10-CM

## 2025-01-20 PROCEDURE — 1125F AMNT PAIN NOTED PAIN PRSNT: CPT | Performed by: NURSE PRACTITIONER

## 2025-01-20 PROCEDURE — 73502 X-RAY EXAM HIP UNI 2-3 VIEWS: CPT

## 2025-01-20 PROCEDURE — 1159F MED LIST DOCD IN RCRD: CPT | Performed by: NURSE PRACTITIONER

## 2025-01-20 PROCEDURE — 99213 OFFICE O/P EST LOW 20 MIN: CPT | Performed by: NURSE PRACTITIONER

## 2025-01-20 PROCEDURE — 1160F RVW MEDS BY RX/DR IN RCRD: CPT | Performed by: NURSE PRACTITIONER

## 2025-01-20 PROCEDURE — 72100 X-RAY EXAM L-S SPINE 2/3 VWS: CPT

## 2025-01-20 PROCEDURE — G2211 COMPLEX E/M VISIT ADD ON: HCPCS | Performed by: NURSE PRACTITIONER

## 2025-01-20 RX ORDER — SENNOSIDES 8.6 MG
650 CAPSULE ORAL EVERY 8 HOURS PRN
COMMUNITY
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
## 2025-02-13 DIAGNOSIS — E66.811 CLASS 1 OBESITY WITH SERIOUS COMORBIDITY AND BODY MASS INDEX (BMI) OF 34.0 TO 34.9 IN ADULT, UNSPECIFIED OBESITY TYPE: ICD-10-CM

## 2025-02-13 RX ORDER — SEMAGLUTIDE 0.5 MG/.5ML
0.5 INJECTION, SOLUTION SUBCUTANEOUS WEEKLY
Qty: 2 ML | Refills: 0 | Status: SHIPPED | OUTPATIENT
Start: 2025-02-13

## 2025-02-20 DIAGNOSIS — E03.9 HYPOTHYROIDISM (ACQUIRED): ICD-10-CM

## 2025-02-20 RX ORDER — LEVOTHYROXINE SODIUM 100 UG/1
100 TABLET ORAL DAILY
Qty: 90 TABLET | Refills: 1 | Status: SHIPPED | OUTPATIENT
Start: 2025-02-20

## 2025-02-20 RX ORDER — BUPROPION HYDROCHLORIDE 150 MG/1
150 TABLET ORAL EVERY MORNING
Qty: 90 TABLET | Refills: 1 | Status: SHIPPED | OUTPATIENT
Start: 2025-02-20

## 2025-02-20 NOTE — TELEPHONE ENCOUNTER
Rx Refill Note  Requested Prescriptions     Pending Prescriptions Disp Refills    levothyroxine (SYNTHROID, LEVOTHROID) 100 MCG tablet [Pharmacy Med Name: LEVOTHYROXINE 100 MCG TABLET] 90 tablet 1     Sig: TAKE 1 TABLET BY MOUTH DAILY    buPROPion XL (WELLBUTRIN XL) 150 MG 24 hr tablet [Pharmacy Med Name: buPROPion HCL  MG TABLET] 90 tablet 1     Sig: TAKE 1 TABLET BY MOUTH EVERY MORNING      Last office visit with prescribing clinician: 1/20/2025   Last telemedicine visit with prescribing clinician: Visit date not found   Next office visit with prescribing clinician: Visit date not found                         Would you like a call back once the refill request has been completed: [] Yes [] No    If the office needs to give you a call back, can they leave a voicemail: [] Yes [] No    Benitez Barber MA  02/20/25, 08:47 EST

## 2025-03-06 NOTE — TELEPHONE ENCOUNTER
Rx Refill Note  Requested Prescriptions     Pending Prescriptions Disp Refills    oxybutynin XL (DITROPAN-XL) 10 MG 24 hr tablet [Pharmacy Med Name: oxyBUTYnin CL ER 10 MG TABLET] 90 tablet 0     Sig: TAKE 1 TABLET BY MOUTH DAILY AS NEEDED FOR OVERACTIVE BLADDER      Last office visit with prescribing clinician: 1/20/2025   Last telemedicine visit with prescribing clinician: Visit date not found   Next office visit with prescribing clinician: Visit date not found                         Would you like a call back once the refill request has been completed: [] Yes [] No    If the office needs to give you a call back, can they leave a voicemail: [] Yes [] No    Benitez Barber MA  03/06/25, 08:17 EST

## 2025-03-07 RX ORDER — OXYBUTYNIN CHLORIDE 10 MG/1
TABLET, EXTENDED RELEASE ORAL
Qty: 90 TABLET | Refills: 0 | Status: SHIPPED | OUTPATIENT
Start: 2025-03-07

## 2025-03-07 NOTE — TELEPHONE ENCOUNTER
Please contact patient, medication was refilled, but needs an appointment in April for weight check-SW

## 2025-03-17 RX ORDER — SEMAGLUTIDE 0.5 MG/.5ML
0.5 INJECTION, SOLUTION SUBCUTANEOUS WEEKLY
Qty: 2 ML | Refills: 0 | Status: SHIPPED | OUTPATIENT
Start: 2025-03-17

## 2025-04-07 ENCOUNTER — OFFICE VISIT (OUTPATIENT)
Dept: FAMILY MEDICINE CLINIC | Facility: CLINIC | Age: 75
End: 2025-04-07
Payer: MEDICARE

## 2025-04-07 VITALS
HEART RATE: 54 BPM | WEIGHT: 177 LBS | DIASTOLIC BLOOD PRESSURE: 80 MMHG | HEIGHT: 66 IN | TEMPERATURE: 97.2 F | OXYGEN SATURATION: 100 % | SYSTOLIC BLOOD PRESSURE: 128 MMHG | BODY MASS INDEX: 28.45 KG/M2 | RESPIRATION RATE: 12 BRPM

## 2025-04-07 DIAGNOSIS — E66.3 OVERWEIGHT (BMI 25.0-29.9): ICD-10-CM

## 2025-04-07 DIAGNOSIS — Z76.89 ENCOUNTER FOR WEIGHT MANAGEMENT: Primary | ICD-10-CM

## 2025-04-07 RX ORDER — SEMAGLUTIDE 0.5 MG/.5ML
0.5 INJECTION, SOLUTION SUBCUTANEOUS WEEKLY
Qty: 9 ML | Refills: 0 | Status: SHIPPED | OUTPATIENT
Start: 2025-04-07

## 2025-04-07 NOTE — PROGRESS NOTES
"Chief Complaint  Encounter for weight management    Subjective        Sharon Gonzalez presents to [facility]  History of Present Illness    History of Present Illness  Patient is here for follow-up on weight management.  She has been doing really well on current semaglutide.  Continues to gradually lose weight.  Denies any side effects.  Has really been working on diet and has even spent a lot more time reading labels and researching ingredients on labels.  She also has been able to be much more active since losing weight.  Less dyspnea with ambulation.    Patient has started dating and is enjoying this.  She denies any health concerns today.     dating    Objective   Vital Signs:  /80   Pulse 54   Temp 97.2 °F (36.2 °C) (Infrared)   Resp 12   Ht 167.6 cm (66\")   Wt 80.3 kg (177 lb)   SpO2 100%   BMI 28.57 kg/m²   Estimated body mass index is 28.57 kg/m² as calculated from the following:    Height as of this encounter: 167.6 cm (66\").    Weight as of this encounter: 80.3 kg (177 lb).               Physical Exam  Vitals and nursing note reviewed.   Constitutional:       General: She is not in acute distress.     Appearance: She is well-developed. She is not ill-appearing or diaphoretic.   HENT:      Head: Normocephalic and atraumatic.   Eyes:      General:         Right eye: No discharge.         Left eye: No discharge.      Conjunctiva/sclera: Conjunctivae normal.   Cardiovascular:      Rate and Rhythm: Normal rate and regular rhythm.   Pulmonary:      Effort: Pulmonary effort is normal.      Breath sounds: Normal breath sounds.   Abdominal:      General: Bowel sounds are normal.      Palpations: Abdomen is soft.   Musculoskeletal:         General: No deformity.      Comments: Gait smooth and steady   Skin:     General: Skin is warm and dry.   Neurological:      Mental Status: She is alert and oriented to person, place, and time.   Psychiatric:         Mood and Affect: " Mood normal.         Behavior: Behavior normal.          Physical Exam       Result Review :            Results                  Assessment and Plan     Diagnoses and all orders for this visit:    1. Encounter for weight management (Primary)    2. Overweight (BMI 25.0-29.9)  -     Semaglutide-Weight Management (Wegovy) 0.5 MG/0.5ML solution auto-injector; Inject 0.5 mL under the skin into the appropriate area as directed 1 (One) Time Per Week.  Dispense: 9 mL; Refill: 0        Assessment & Plan  Patient is doing very well on current dose of semaglutide which we will continue.  She has lost approximately 10 pounds since last visit and has been gradually consistently losing 5 to 10 pounds between visits and changed eating habits as well as been able to exercise more.  Will continue.  Side effects again reinforced.    Patient is not planning to be sexually active with new boyfriend in next month or 2.  Discussed safer sex.            Follow Up     No follow-ups on file.  Patient was given instructions and counseling regarding her condition or for health maintenance advice. Please see specific information pulled into the AVS if appropriate.

## 2025-05-15 DIAGNOSIS — E66.3 OVERWEIGHT (BMI 25.0-29.9): ICD-10-CM

## 2025-05-15 RX ORDER — SEMAGLUTIDE 0.5 MG/.5ML
0.5 INJECTION, SOLUTION SUBCUTANEOUS WEEKLY
Qty: 2 ML | Refills: 2 | Status: SHIPPED | OUTPATIENT
Start: 2025-05-15

## 2025-05-15 NOTE — TELEPHONE ENCOUNTER
Called patient and lvm for patient to return call to see if patient would like to go up to the next dose.

## 2025-05-15 NOTE — TELEPHONE ENCOUNTER
"  Caller: Sharon Gonzalez    Relationship: Self    Best call back number: [phone]     Requested Prescriptions:   Requested Prescriptions     Pending Prescriptions Disp Refills    Semaglutide-Weight Management (Wegovy) 0.5 MG/0.5ML solution auto-injector 9 mL 0     Sig: Inject 0.5 mL under the skin into the appropriate area as directed 1 (One) Time Per Week.        Pharmacy where request should be sent: [facility] [place] \"COMPOUNDS ONLY\" - [facility] [address] [facility] [phone] [facility] [phone]      Last office visit with prescribing clinician: 4/7/2025   Last telemedicine visit with prescribing clinician: Visit date not found   Next office visit with prescribing clinician: Visit date not found     Additional details provided by patient:     NEXT DOSE IS DUE 5/18/2025    Does the patient have less than a 3 day supply:  [x] Yes  [] No    Would you like a call back once the refill request has been completed: [] Yes [] No    If the office needs to give you a call back, can they leave a voicemail: [] Yes [] No    Sukhdev Fitzpatrick Rep   05/15/25 10:20 EDT         "

## 2025-05-15 NOTE — TELEPHONE ENCOUNTER
Rx Refill Note  Requested Prescriptions     Pending Prescriptions Disp Refills    Semaglutide-Weight Management (Wegovy) 0.5 MG/0.5ML solution auto-injector 9 mL 0     Sig: Inject 0.5 mL under the skin into the appropriate area as directed 1 (One) Time Per Week.      Last office visit with prescribing clinician: 4/7/2025   Last telemedicine visit with prescribing clinician: Visit date not found   Next office visit with prescribing clinician: Visit date not found                         Would you like a call back once the refill request has been completed: [] Yes [] No    If the office needs to give you a call back, can they leave a voicemail: [] Yes [] No    Robert Meehan MA  05/15/25, 13:14 EDT

## 2025-06-02 RX ORDER — OXYBUTYNIN CHLORIDE 10 MG/1
TABLET, EXTENDED RELEASE ORAL
Qty: 90 TABLET | Refills: 0 | Status: SHIPPED | OUTPATIENT
Start: 2025-06-02

## 2025-06-02 NOTE — TELEPHONE ENCOUNTER
Rx Refill Note  Requested Prescriptions     Pending Prescriptions Disp Refills    oxybutynin XL (DITROPAN-XL) 10 MG 24 hr tablet [Pharmacy Med Name: oxyBUTYnin CL ER 10 MG TABLET] 90 tablet 1     Sig: TAKE 1 TABLET BY MOUTH DAILY AS NEEDED FOR OVERACTIVE BLADDER      Last office visit with prescribing clinician: 4/7/2025   Last telemedicine visit with prescribing clinician: Visit date not found   Next office visit with prescribing clinician: Visit date not found                         Would you like a call back once the refill request has been completed: [] Yes [] No    If the office needs to give you a call back, can they leave a voicemail: [] Yes [] No    Sukhdev Singh Rep  06/02/25, 08:28 EDT

## 2025-06-03 ENCOUNTER — TELEPHONE (OUTPATIENT)
Dept: FAMILY MEDICINE CLINIC | Facility: CLINIC | Age: 75
End: 2025-06-03
Payer: MEDICARE

## 2025-06-03 DIAGNOSIS — E03.9 HYPOTHYROIDISM (ACQUIRED): Primary | ICD-10-CM

## 2025-06-03 DIAGNOSIS — R73.03 PREDIABETES: ICD-10-CM

## 2025-06-03 DIAGNOSIS — E78.00 PURE HYPERCHOLESTEROLEMIA: ICD-10-CM

## 2025-06-03 NOTE — TELEPHONE ENCOUNTER
Pt is scheduled for her labs on August 4/2025. Please review and sign labs for sudheer. Once signed please send message to  pool so that pt can be contacted for scheduling. She also wanted you to know that she now weighs 168.0 lbs.

## 2025-07-09 ENCOUNTER — TELEPHONE (OUTPATIENT)
Dept: FAMILY MEDICINE CLINIC | Facility: CLINIC | Age: 75
End: 2025-07-09
Payer: MEDICARE

## 2025-07-10 NOTE — TELEPHONE ENCOUNTER
Per care gaps. Patient last wellness exam was 8/1/24. Patient scheduled this year for 8/4/25. Good to go

## 2025-07-14 DIAGNOSIS — E66.3 OVERWEIGHT (BMI 25.0-29.9): ICD-10-CM

## 2025-07-14 RX ORDER — SEMAGLUTIDE 0.5 MG/.5ML
0.5 INJECTION, SOLUTION SUBCUTANEOUS WEEKLY
Qty: 2 ML | Refills: 1 | Status: SHIPPED | OUTPATIENT
Start: 2025-07-14

## 2025-07-14 NOTE — TELEPHONE ENCOUNTER
"Caller: Sharon Gonzalez    Relationship: Self    Requested Prescriptions:   Requested Prescriptions     Pending Prescriptions Disp Refills    Semaglutide-Weight Management (Wegovy) 0.5 MG/0.5ML solution auto-injector 2 mL 2     Sig: Inject 0.5 mL under the skin into the appropriate area as directed 1 (One) Time Per Week.      Pharmacy where request should be sent: [facility] \"COMPOUNDS ONLY\" - [place], [place] - [address] [place] [address] [phone] [facility] [phone]      Last office visit with prescribing clinician: Visit date not found   Last telemedicine visit with prescribing clinician: Visit date not found   Next office visit with prescribing clinician: Visit date not found     Additional details provided by patient:  PATIENT IS WANTING TO INCREASE THIS MEDICATION TO THE NEXT LEVEL.       Sukhdev Franklin Rep   07/14/25 10:52 EDT     "

## 2025-07-14 NOTE — TELEPHONE ENCOUNTER
Rx Refill Note  Requested Prescriptions     Pending Prescriptions Disp Refills    Semaglutide-Weight Management (Wegovy) 0.5 MG/0.5ML solution auto-injector 2 mL 2     Sig: Inject 0.5 mL under the skin into the appropriate area as directed 1 (One) Time Per Week.      Last office visit with prescribing clinician: Visit date not found   Last telemedicine visit with prescribing clinician: Visit date not found   Next office visit with prescribing clinician: Visit date not found                         Would you like a call back once the refill request has been completed: [] Yes [] No    If the office needs to give you a call back, can they leave a voicemail: [] Yes [] No    Pt requesting to increase to next dose    José Antonio Olivares MA  07/14/25, 11:06 EDT

## 2025-07-24 ENCOUNTER — TELEPHONE (OUTPATIENT)
Dept: FAMILY MEDICINE CLINIC | Facility: CLINIC | Age: 75
End: 2025-07-24
Payer: MEDICARE

## 2025-07-24 NOTE — TELEPHONE ENCOUNTER
Caller: Sharon Gonzalez    Relationship: Self    Best call back number: [phone]         Who are you requesting to speak with (clinical staff, provider,  specific staff member): PCP OR CLINICAL        What was the call regarding: WANTS TO DISCUSS SEMAGLUTIDE WITH KARY

## 2025-07-24 NOTE — TELEPHONE ENCOUNTER
Called Pt and advised Pt of Rosi's previous message. Pt stated she lost her current prescription and asked if Adri can send in a new prescription of the next dose up and stated she knows insurance does not cover and will pay for it herself. If not able to send in next dose up she will wait until her appt with Adri on 08/04/25. Please advise if next dose can be sent in? Thank you!

## 2025-07-24 NOTE — TELEPHONE ENCOUNTER
Pt will need an appt let he know this can be done at next appt - please have her call her insurance to see what they will cover

## 2025-08-19 ENCOUNTER — LAB (OUTPATIENT)
Dept: LAB | Facility: HOSPITAL | Age: 75
End: 2025-08-19
Payer: MEDICARE

## 2025-08-19 PROCEDURE — 80053 COMPREHEN METABOLIC PANEL: CPT | Performed by: NURSE PRACTITIONER

## 2025-08-19 PROCEDURE — 84443 ASSAY THYROID STIM HORMONE: CPT | Performed by: NURSE PRACTITIONER

## 2025-08-19 PROCEDURE — 80061 LIPID PANEL: CPT | Performed by: NURSE PRACTITIONER

## 2025-08-19 PROCEDURE — 84439 ASSAY OF FREE THYROXINE: CPT | Performed by: NURSE PRACTITIONER

## 2025-08-21 ENCOUNTER — RESULTS FOLLOW-UP (OUTPATIENT)
Dept: FAMILY MEDICINE CLINIC | Facility: CLINIC | Age: 75
End: 2025-08-21
Payer: MEDICARE

## 2025-08-21 DIAGNOSIS — E66.3 OVERWEIGHT (BMI 25.0-29.9): ICD-10-CM

## 2025-08-21 DIAGNOSIS — E03.9 ACQUIRED HYPOTHYROIDISM: Primary | ICD-10-CM

## 2025-08-21 RX ORDER — LEVOTHYROXINE SODIUM 88 UG/1
88 TABLET ORAL
Qty: 90 TABLET | Refills: 0 | Status: SHIPPED | OUTPATIENT
Start: 2025-08-21

## 2025-08-25 ENCOUNTER — TELEPHONE (OUTPATIENT)
Dept: FAMILY MEDICINE CLINIC | Facility: CLINIC | Age: 75
End: 2025-08-25
Payer: MEDICARE

## 2025-08-26 RX ORDER — SEMAGLUTIDE 0.5 MG/.5ML
0.5 INJECTION, SOLUTION SUBCUTANEOUS WEEKLY
Qty: 2 ML | Refills: 1 | Status: SHIPPED | OUTPATIENT
Start: 2025-08-26